# Patient Record
Sex: FEMALE | Race: ASIAN | NOT HISPANIC OR LATINO | Employment: OTHER | URBAN - METROPOLITAN AREA
[De-identification: names, ages, dates, MRNs, and addresses within clinical notes are randomized per-mention and may not be internally consistent; named-entity substitution may affect disease eponyms.]

---

## 2018-11-24 ENCOUNTER — AMBULATORY - HEALTHEAST (OUTPATIENT)
Dept: NURSING | Facility: CLINIC | Age: 78
End: 2018-11-24

## 2018-11-24 DIAGNOSIS — Z23 FLU VACCINE NEED: ICD-10-CM

## 2019-01-02 ENCOUNTER — OFFICE VISIT - HEALTHEAST (OUTPATIENT)
Dept: INTERNAL MEDICINE | Facility: CLINIC | Age: 79
End: 2019-01-02

## 2019-01-02 ENCOUNTER — COMMUNICATION - HEALTHEAST (OUTPATIENT)
Dept: TELEHEALTH | Facility: CLINIC | Age: 79
End: 2019-01-02

## 2019-01-02 DIAGNOSIS — Z12.31 ENCOUNTER FOR SCREENING MAMMOGRAM FOR MALIGNANT NEOPLASM OF BREAST: ICD-10-CM

## 2019-01-02 DIAGNOSIS — R03.0 ELEVATED BLOOD PRESSURE READING WITHOUT DIAGNOSIS OF HYPERTENSION: ICD-10-CM

## 2019-01-02 DIAGNOSIS — K90.9 INTESTINAL MALABSORPTION: ICD-10-CM

## 2019-01-02 DIAGNOSIS — Z00.00 ROUTINE GENERAL MEDICAL EXAMINATION AT A HEALTH CARE FACILITY: ICD-10-CM

## 2019-01-02 DIAGNOSIS — R19.8 OTHER SPECIFIED SYMPTOMS AND SIGNS INVOLVING THE DIGESTIVE SYSTEM AND ABDOMEN: ICD-10-CM

## 2019-01-02 DIAGNOSIS — Z00.00 ROUTINE HEALTH MAINTENANCE: ICD-10-CM

## 2019-01-02 DIAGNOSIS — M81.0 OSTEOPOROSIS: ICD-10-CM

## 2019-01-02 DIAGNOSIS — R31.9 HEMATURIA, UNSPECIFIED: ICD-10-CM

## 2019-01-02 DIAGNOSIS — K21.00 REFLUX ESOPHAGITIS: ICD-10-CM

## 2019-01-02 ASSESSMENT — MIFFLIN-ST. JEOR: SCORE: 853.61

## 2019-01-11 ENCOUNTER — AMBULATORY - HEALTHEAST (OUTPATIENT)
Dept: LAB | Facility: CLINIC | Age: 79
End: 2019-01-11

## 2019-01-11 ENCOUNTER — COMMUNICATION - HEALTHEAST (OUTPATIENT)
Dept: INTERNAL MEDICINE | Facility: CLINIC | Age: 79
End: 2019-01-11

## 2019-01-11 DIAGNOSIS — K90.9 INTESTINAL MALABSORPTION: ICD-10-CM

## 2019-01-11 DIAGNOSIS — M81.0 OSTEOPOROSIS: ICD-10-CM

## 2019-01-11 DIAGNOSIS — R03.0 ELEVATED BLOOD PRESSURE READING WITHOUT DIAGNOSIS OF HYPERTENSION: ICD-10-CM

## 2019-01-11 DIAGNOSIS — Z00.00 ROUTINE HEALTH MAINTENANCE: ICD-10-CM

## 2019-01-11 DIAGNOSIS — R31.9 HEMATURIA, UNSPECIFIED: ICD-10-CM

## 2019-01-11 DIAGNOSIS — R19.8 OTHER SPECIFIED SYMPTOMS AND SIGNS INVOLVING THE DIGESTIVE SYSTEM AND ABDOMEN: ICD-10-CM

## 2019-01-11 LAB
ALBUMIN SERPL-MCNC: 3.9 G/DL (ref 3.5–5)
ALBUMIN UR-MCNC: NEGATIVE MG/DL
ALP SERPL-CCNC: 126 U/L (ref 45–120)
ALT SERPL W P-5'-P-CCNC: 14 U/L (ref 0–45)
ANION GAP SERPL CALCULATED.3IONS-SCNC: 12 MMOL/L (ref 5–18)
APPEARANCE UR: CLEAR
AST SERPL W P-5'-P-CCNC: 21 U/L (ref 0–40)
BACTERIA #/AREA URNS HPF: ABNORMAL HPF
BILIRUB SERPL-MCNC: 0.5 MG/DL (ref 0–1)
BILIRUB UR QL STRIP: NEGATIVE
BUN SERPL-MCNC: 15 MG/DL (ref 8–28)
CALCIUM SERPL-MCNC: 9.6 MG/DL (ref 8.5–10.5)
CHLORIDE BLD-SCNC: 104 MMOL/L (ref 98–107)
CHOLEST SERPL-MCNC: 207 MG/DL
CO2 SERPL-SCNC: 24 MMOL/L (ref 22–31)
COLOR UR AUTO: YELLOW
CREAT SERPL-MCNC: 0.82 MG/DL (ref 0.6–1.1)
ERYTHROCYTE [DISTWIDTH] IN BLOOD BY AUTOMATED COUNT: 12.2 % (ref 11–14.5)
FASTING STATUS PATIENT QL REPORTED: YES
GFR SERPL CREATININE-BSD FRML MDRD: >60 ML/MIN/1.73M2
GLUCOSE BLD-MCNC: 84 MG/DL (ref 70–125)
GLUCOSE UR STRIP-MCNC: NEGATIVE MG/DL
HCT VFR BLD AUTO: 44.5 % (ref 35–47)
HDLC SERPL-MCNC: 55 MG/DL
HGB BLD-MCNC: 14.6 G/DL (ref 12–16)
HGB UR QL STRIP: NEGATIVE
KETONES UR STRIP-MCNC: NEGATIVE MG/DL
LDLC SERPL CALC-MCNC: 123 MG/DL
LEUKOCYTE ESTERASE UR QL STRIP: ABNORMAL
MCH RBC QN AUTO: 29.5 PG (ref 27–34)
MCHC RBC AUTO-ENTMCNC: 32.8 G/DL (ref 32–36)
MCV RBC AUTO: 90 FL (ref 80–100)
NITRATE UR QL: NEGATIVE
PH UR STRIP: 5 [PH] (ref 5–8)
PLATELET # BLD AUTO: 367 THOU/UL (ref 140–440)
PMV BLD AUTO: 6.7 FL (ref 7–10)
POTASSIUM BLD-SCNC: 4.5 MMOL/L (ref 3.5–5)
PROT SERPL-MCNC: 8 G/DL (ref 6–8)
RBC # BLD AUTO: 4.95 MILL/UL (ref 3.8–5.4)
RBC #/AREA URNS AUTO: ABNORMAL HPF
SODIUM SERPL-SCNC: 140 MMOL/L (ref 136–145)
SP GR UR STRIP: 1.02 (ref 1–1.03)
SQUAMOUS #/AREA URNS AUTO: ABNORMAL LPF
TRIGL SERPL-MCNC: 147 MG/DL
TSH SERPL DL<=0.005 MIU/L-ACNC: 2.4 UIU/ML (ref 0.3–5)
UROBILINOGEN UR STRIP-ACNC: ABNORMAL
WBC #/AREA URNS AUTO: ABNORMAL HPF
WBC: 7.5 THOU/UL (ref 4–11)

## 2019-01-14 LAB — 25(OH)D3 SERPL-MCNC: 22 NG/ML (ref 30–80)

## 2019-01-15 ENCOUNTER — COMMUNICATION - HEALTHEAST (OUTPATIENT)
Dept: INTERNAL MEDICINE | Facility: CLINIC | Age: 79
End: 2019-01-15

## 2019-01-16 ENCOUNTER — COMMUNICATION - HEALTHEAST (OUTPATIENT)
Dept: INTERNAL MEDICINE | Facility: CLINIC | Age: 79
End: 2019-01-16

## 2019-01-16 ENCOUNTER — OFFICE VISIT - HEALTHEAST (OUTPATIENT)
Dept: INTERNAL MEDICINE | Facility: CLINIC | Age: 79
End: 2019-01-16

## 2019-01-16 DIAGNOSIS — R03.0 ELEVATED BP WITHOUT DIAGNOSIS OF HYPERTENSION: ICD-10-CM

## 2019-01-16 DIAGNOSIS — Z00.00 ROUTINE HEALTH MAINTENANCE: ICD-10-CM

## 2019-01-16 LAB
HEMOCCULT SP1 STL QL: NEGATIVE
HEMOCCULT SP2 STL QL: NEGATIVE
HEMOCCULT SP3 STL QL: NEGATIVE

## 2019-01-21 ENCOUNTER — RECORDS - HEALTHEAST (OUTPATIENT)
Dept: BONE DENSITY | Facility: CLINIC | Age: 79
End: 2019-01-21

## 2019-01-21 ENCOUNTER — RECORDS - HEALTHEAST (OUTPATIENT)
Dept: ADMINISTRATIVE | Facility: OTHER | Age: 79
End: 2019-01-21

## 2019-01-21 DIAGNOSIS — M81.0 AGE-RELATED OSTEOPOROSIS WITHOUT CURRENT PATHOLOGICAL FRACTURE: ICD-10-CM

## 2019-01-31 ENCOUNTER — COMMUNICATION - HEALTHEAST (OUTPATIENT)
Dept: TELEHEALTH | Facility: CLINIC | Age: 79
End: 2019-01-31

## 2019-01-31 ENCOUNTER — COMMUNICATION - HEALTHEAST (OUTPATIENT)
Dept: INTERNAL MEDICINE | Facility: CLINIC | Age: 79
End: 2019-01-31

## 2019-01-31 ENCOUNTER — OFFICE VISIT - HEALTHEAST (OUTPATIENT)
Dept: INTERNAL MEDICINE | Facility: CLINIC | Age: 79
End: 2019-01-31

## 2019-01-31 DIAGNOSIS — M81.0 AGE-RELATED OSTEOPOROSIS WITHOUT CURRENT PATHOLOGICAL FRACTURE: ICD-10-CM

## 2019-01-31 DIAGNOSIS — R74.8 ELEVATED ALKALINE PHOSPHATASE LEVEL: ICD-10-CM

## 2019-02-08 ENCOUNTER — COMMUNICATION - HEALTHEAST (OUTPATIENT)
Dept: INTERNAL MEDICINE | Facility: CLINIC | Age: 79
End: 2019-02-08

## 2019-02-15 ENCOUNTER — OFFICE VISIT - HEALTHEAST (OUTPATIENT)
Dept: PHARMACY | Facility: CLINIC | Age: 79
End: 2019-02-15

## 2019-02-15 ENCOUNTER — COMMUNICATION - HEALTHEAST (OUTPATIENT)
Dept: TELEHEALTH | Facility: CLINIC | Age: 79
End: 2019-02-15

## 2019-02-15 DIAGNOSIS — M81.0 AGE-RELATED OSTEOPOROSIS WITHOUT CURRENT PATHOLOGICAL FRACTURE: ICD-10-CM

## 2019-02-21 ENCOUNTER — HOSPITAL ENCOUNTER (OUTPATIENT)
Dept: NUCLEAR MEDICINE | Facility: CLINIC | Age: 79
Discharge: HOME | End: 2019-02-21
Attending: INTERNAL MEDICINE

## 2019-02-21 DIAGNOSIS — M81.0 AGE-RELATED OSTEOPOROSIS WITHOUT CURRENT PATHOLOGICAL FRACTURE: ICD-10-CM

## 2019-02-21 DIAGNOSIS — R74.8 ELEVATED ALKALINE PHOSPHATASE LEVEL: ICD-10-CM

## 2019-02-22 ENCOUNTER — COMMUNICATION - HEALTHEAST (OUTPATIENT)
Dept: INTERNAL MEDICINE | Facility: CLINIC | Age: 79
End: 2019-02-22

## 2019-03-20 ENCOUNTER — OFFICE VISIT - HEALTHEAST (OUTPATIENT)
Dept: INTERNAL MEDICINE | Facility: CLINIC | Age: 79
End: 2019-03-20

## 2019-03-20 DIAGNOSIS — M81.0 AGE-RELATED OSTEOPOROSIS WITHOUT CURRENT PATHOLOGICAL FRACTURE: ICD-10-CM

## 2019-03-20 LAB
CALCIUM, IONIZED MEASURED: 1.17 MMOL/L (ref 1.11–1.3)
ION CA PH 7.4: 1.14 MMOL/L (ref 1.11–1.3)
PH: 7.33 (ref 7.35–7.45)

## 2019-03-21 ENCOUNTER — COMMUNICATION - HEALTHEAST (OUTPATIENT)
Dept: INTERNAL MEDICINE | Facility: CLINIC | Age: 79
End: 2019-03-21

## 2019-06-28 ENCOUNTER — COMMUNICATION - HEALTHEAST (OUTPATIENT)
Dept: GERIATRIC MEDICINE | Facility: CLINIC | Age: 79
End: 2019-06-28

## 2019-11-07 ENCOUNTER — COMMUNICATION - HEALTHEAST (OUTPATIENT)
Dept: GERIATRIC MEDICINE | Facility: CLINIC | Age: 79
End: 2019-11-07

## 2019-11-15 ENCOUNTER — COMMUNICATION - HEALTHEAST (OUTPATIENT)
Dept: GERIATRIC MEDICINE | Facility: CLINIC | Age: 79
End: 2019-11-15

## 2019-11-21 ASSESSMENT — ACTIVITIES OF DAILY LIVING (ADL): DEPENDENT_IADLS:: INDEPENDENT

## 2019-12-02 ENCOUNTER — COMMUNICATION - HEALTHEAST (OUTPATIENT)
Dept: GERIATRIC MEDICINE | Facility: CLINIC | Age: 79
End: 2019-12-02

## 2019-12-06 ENCOUNTER — AMBULATORY - HEALTHEAST (OUTPATIENT)
Dept: NURSING | Facility: CLINIC | Age: 79
End: 2019-12-06

## 2019-12-06 DIAGNOSIS — Z23 NEED FOR IMMUNIZATION AGAINST INFLUENZA: ICD-10-CM

## 2019-12-31 ENCOUNTER — COMMUNICATION - HEALTHEAST (OUTPATIENT)
Dept: TELEHEALTH | Facility: CLINIC | Age: 79
End: 2019-12-31

## 2020-01-03 ENCOUNTER — OFFICE VISIT - HEALTHEAST (OUTPATIENT)
Dept: INTERNAL MEDICINE | Facility: CLINIC | Age: 80
End: 2020-01-03

## 2020-01-03 DIAGNOSIS — M81.0 AGE-RELATED OSTEOPOROSIS WITHOUT CURRENT PATHOLOGICAL FRACTURE: ICD-10-CM

## 2020-01-03 DIAGNOSIS — R20.9 ALTERED SENSATION OF FOOT: ICD-10-CM

## 2020-01-03 DIAGNOSIS — K21.00 REFLUX ESOPHAGITIS: ICD-10-CM

## 2020-01-03 DIAGNOSIS — R26.89 BALANCE PROBLEMS: ICD-10-CM

## 2020-01-03 DIAGNOSIS — R74.8 ELEVATED ALKALINE PHOSPHATASE LEVEL: ICD-10-CM

## 2020-01-03 DIAGNOSIS — Z00.00 ROUTINE GENERAL MEDICAL EXAMINATION AT A HEALTH CARE FACILITY: ICD-10-CM

## 2020-01-03 LAB
ALBUMIN SERPL-MCNC: 4.1 G/DL (ref 3.5–5)
ALBUMIN UR-MCNC: NEGATIVE MG/DL
ALP SERPL-CCNC: 198 U/L (ref 45–120)
ALT SERPL W P-5'-P-CCNC: 14 U/L (ref 0–45)
ANION GAP SERPL CALCULATED.3IONS-SCNC: 12 MMOL/L (ref 5–18)
APPEARANCE UR: CLEAR
AST SERPL W P-5'-P-CCNC: 20 U/L (ref 0–40)
BACTERIA #/AREA URNS HPF: ABNORMAL HPF
BILIRUB SERPL-MCNC: 0.3 MG/DL (ref 0–1)
BILIRUB UR QL STRIP: NEGATIVE
BUN SERPL-MCNC: 25 MG/DL (ref 8–28)
CALCIUM SERPL-MCNC: 9.8 MG/DL (ref 8.5–10.5)
CHLORIDE BLD-SCNC: 103 MMOL/L (ref 98–107)
CO2 SERPL-SCNC: 25 MMOL/L (ref 22–31)
COLOR UR AUTO: YELLOW
CREAT SERPL-MCNC: 0.77 MG/DL (ref 0.6–1.1)
ERYTHROCYTE [DISTWIDTH] IN BLOOD BY AUTOMATED COUNT: 11.8 % (ref 11–14.5)
GFR SERPL CREATININE-BSD FRML MDRD: >60 ML/MIN/1.73M2
GLUCOSE BLD-MCNC: 93 MG/DL (ref 70–125)
GLUCOSE UR STRIP-MCNC: NEGATIVE MG/DL
HCT VFR BLD AUTO: 42.3 % (ref 35–47)
HGB BLD-MCNC: 13.8 G/DL (ref 12–16)
HGB UR QL STRIP: ABNORMAL
KETONES UR STRIP-MCNC: NEGATIVE MG/DL
LDLC SERPL CALC-MCNC: 165 MG/DL
LEUKOCYTE ESTERASE UR QL STRIP: ABNORMAL
MAGNESIUM SERPL-MCNC: 2 MG/DL (ref 1.8–2.6)
MCH RBC QN AUTO: 30 PG (ref 27–34)
MCHC RBC AUTO-ENTMCNC: 32.6 G/DL (ref 32–36)
MCV RBC AUTO: 92 FL (ref 80–100)
MUCOUS THREADS #/AREA URNS LPF: ABNORMAL LPF
NITRATE UR QL: NEGATIVE
PH UR STRIP: 5.5 [PH] (ref 5–8)
PLATELET # BLD AUTO: 362 THOU/UL (ref 140–440)
PMV BLD AUTO: 6.3 FL (ref 7–10)
POTASSIUM BLD-SCNC: 4.3 MMOL/L (ref 3.5–5)
PROT SERPL-MCNC: 7.9 G/DL (ref 6–8)
RBC # BLD AUTO: 4.61 MILL/UL (ref 3.8–5.4)
RBC #/AREA URNS AUTO: ABNORMAL HPF
SODIUM SERPL-SCNC: 140 MMOL/L (ref 136–145)
SP GR UR STRIP: 1.02 (ref 1–1.03)
SQUAMOUS #/AREA URNS AUTO: ABNORMAL LPF
TSH SERPL DL<=0.005 MIU/L-ACNC: 1.39 UIU/ML (ref 0.3–5)
UROBILINOGEN UR STRIP-ACNC: ABNORMAL
VIT B12 SERPL-MCNC: 242 PG/ML (ref 213–816)
WBC #/AREA URNS AUTO: ABNORMAL HPF
WBC: 8.5 THOU/UL (ref 4–11)

## 2020-01-03 ASSESSMENT — MIFFLIN-ST. JEOR: SCORE: 868.58

## 2020-01-04 LAB — BACTERIA SPEC CULT: NO GROWTH

## 2020-01-05 ENCOUNTER — COMMUNICATION - HEALTHEAST (OUTPATIENT)
Dept: INTERNAL MEDICINE | Facility: CLINIC | Age: 80
End: 2020-01-05

## 2020-01-05 DIAGNOSIS — Z00.00 ROUTINE GENERAL MEDICAL EXAMINATION AT A HEALTH CARE FACILITY: ICD-10-CM

## 2020-01-06 LAB
25(OH)D3 SERPL-MCNC: 32.4 NG/ML (ref 30–80)
25(OH)D3 SERPL-MCNC: 32.4 NG/ML (ref 30–80)

## 2020-01-08 LAB
ALP BONE SERPL-CCNC: 107 U/L (ref 0–55)
ALP LIVER SERPL-CCNC: 88 U/L (ref 0–94)
ALP OTHER SERPL-CCNC: 0 U/L
ALP SERPL-CCNC: 195 U/L (ref 40–120)

## 2020-01-13 ENCOUNTER — HOSPITAL ENCOUNTER (OUTPATIENT)
Dept: MAMMOGRAPHY | Facility: CLINIC | Age: 80
Discharge: HOME | End: 2020-01-13
Attending: INTERNAL MEDICINE

## 2020-01-13 DIAGNOSIS — Z00.00 ROUTINE HEALTH MAINTENANCE: ICD-10-CM

## 2020-01-13 DIAGNOSIS — Z12.31 ENCOUNTER FOR SCREENING MAMMOGRAM FOR MALIGNANT NEOPLASM OF BREAST: ICD-10-CM

## 2020-01-31 ENCOUNTER — RECORDS - HEALTHEAST (OUTPATIENT)
Dept: BONE DENSITY | Facility: CLINIC | Age: 80
End: 2020-01-31

## 2020-01-31 ENCOUNTER — RECORDS - HEALTHEAST (OUTPATIENT)
Dept: ADMINISTRATIVE | Facility: OTHER | Age: 80
End: 2020-01-31

## 2020-01-31 DIAGNOSIS — Z00.00 ENCOUNTER FOR GENERAL ADULT MEDICAL EXAMINATION WITHOUT ABNORMAL FINDINGS: ICD-10-CM

## 2020-02-07 ENCOUNTER — OFFICE VISIT - HEALTHEAST (OUTPATIENT)
Dept: PHYSICAL THERAPY | Facility: REHABILITATION | Age: 80
End: 2020-02-07

## 2020-02-07 DIAGNOSIS — R29.898 WEAKNESS OF BOTH LOWER EXTREMITIES: ICD-10-CM

## 2020-02-07 DIAGNOSIS — R26.89 BALANCE PROBLEMS: ICD-10-CM

## 2020-02-07 DIAGNOSIS — R26.81 UNSTEADINESS ON FEET: ICD-10-CM

## 2020-03-04 ENCOUNTER — COMMUNICATION - HEALTHEAST (OUTPATIENT)
Dept: INTERNAL MEDICINE | Facility: CLINIC | Age: 80
End: 2020-03-04

## 2020-03-04 DIAGNOSIS — K21.00 REFLUX ESOPHAGITIS: ICD-10-CM

## 2020-03-10 ENCOUNTER — AMBULATORY - HEALTHEAST (OUTPATIENT)
Dept: OTHER | Facility: CLINIC | Age: 80
End: 2020-03-10

## 2020-03-31 ENCOUNTER — COMMUNICATION - HEALTHEAST (OUTPATIENT)
Dept: GERIATRIC MEDICINE | Facility: CLINIC | Age: 80
End: 2020-03-31

## 2020-05-19 ENCOUNTER — COMMUNICATION - HEALTHEAST (OUTPATIENT)
Dept: GERIATRIC MEDICINE | Facility: CLINIC | Age: 80
End: 2020-05-19

## 2020-09-03 ENCOUNTER — COMMUNICATION - HEALTHEAST (OUTPATIENT)
Dept: GERIATRIC MEDICINE | Facility: CLINIC | Age: 80
End: 2020-09-03

## 2020-09-08 ENCOUNTER — COMMUNICATION - HEALTHEAST (OUTPATIENT)
Dept: GERIATRIC MEDICINE | Facility: CLINIC | Age: 80
End: 2020-09-08

## 2020-09-25 ENCOUNTER — COMMUNICATION - HEALTHEAST (OUTPATIENT)
Dept: GERIATRIC MEDICINE | Facility: CLINIC | Age: 80
End: 2020-09-25

## 2020-09-29 ASSESSMENT — ACTIVITIES OF DAILY LIVING (ADL): DEPENDENT_IADLS:: INDEPENDENT

## 2020-10-01 ENCOUNTER — COMMUNICATION - HEALTHEAST (OUTPATIENT)
Dept: GERIATRIC MEDICINE | Facility: CLINIC | Age: 80
End: 2020-10-01

## 2020-10-23 ENCOUNTER — AMBULATORY - HEALTHEAST (OUTPATIENT)
Dept: NURSING | Facility: CLINIC | Age: 80
End: 2020-10-23

## 2020-12-08 ENCOUNTER — COMMUNICATION - HEALTHEAST (OUTPATIENT)
Dept: INTERNAL MEDICINE | Facility: CLINIC | Age: 80
End: 2020-12-08

## 2020-12-08 DIAGNOSIS — Z00.00 ROUTINE GENERAL MEDICAL EXAMINATION AT A HEALTH CARE FACILITY: ICD-10-CM

## 2021-01-06 ENCOUNTER — OFFICE VISIT - HEALTHEAST (OUTPATIENT)
Dept: INTERNAL MEDICINE | Facility: CLINIC | Age: 81
End: 2021-01-06

## 2021-01-06 DIAGNOSIS — E78.5 DYSLIPIDEMIA: ICD-10-CM

## 2021-01-06 DIAGNOSIS — K21.00 GASTROESOPHAGEAL REFLUX DISEASE WITH ESOPHAGITIS WITHOUT HEMORRHAGE: ICD-10-CM

## 2021-01-06 DIAGNOSIS — M81.0 AGE-RELATED OSTEOPOROSIS WITHOUT CURRENT PATHOLOGICAL FRACTURE: ICD-10-CM

## 2021-01-06 DIAGNOSIS — Z00.00 ROUTINE GENERAL MEDICAL EXAMINATION AT A HEALTH CARE FACILITY: ICD-10-CM

## 2021-01-06 DIAGNOSIS — Z92.29 PERSONAL HISTORY OF OTHER DRUG THERAPY: ICD-10-CM

## 2021-01-06 DIAGNOSIS — K21.00 REFLUX ESOPHAGITIS: ICD-10-CM

## 2021-01-06 LAB
ALBUMIN SERPL-MCNC: 4 G/DL (ref 3.5–5)
ALBUMIN UR-MCNC: NEGATIVE MG/DL
ALP SERPL-CCNC: 179 U/L (ref 45–120)
ALT SERPL W P-5'-P-CCNC: 13 U/L (ref 0–45)
ANION GAP SERPL CALCULATED.3IONS-SCNC: 12 MMOL/L (ref 5–18)
APPEARANCE UR: CLEAR
AST SERPL W P-5'-P-CCNC: 20 U/L (ref 0–40)
BACTERIA #/AREA URNS HPF: ABNORMAL HPF
BILIRUB SERPL-MCNC: 0.6 MG/DL (ref 0–1)
BILIRUB UR QL STRIP: NEGATIVE
BUN SERPL-MCNC: 20 MG/DL (ref 8–28)
CALCIUM SERPL-MCNC: 9.4 MG/DL (ref 8.5–10.5)
CHLORIDE BLD-SCNC: 103 MMOL/L (ref 98–107)
CHOLEST SERPL-MCNC: 211 MG/DL
CO2 SERPL-SCNC: 26 MMOL/L (ref 22–31)
COLOR UR AUTO: YELLOW
CREAT SERPL-MCNC: 0.8 MG/DL (ref 0.6–1.1)
ERYTHROCYTE [DISTWIDTH] IN BLOOD BY AUTOMATED COUNT: 11.9 % (ref 11–14.5)
FASTING STATUS PATIENT QL REPORTED: YES
GFR SERPL CREATININE-BSD FRML MDRD: >60 ML/MIN/1.73M2
GLUCOSE BLD-MCNC: 87 MG/DL (ref 70–125)
GLUCOSE UR STRIP-MCNC: NEGATIVE MG/DL
HCT VFR BLD AUTO: 44.3 % (ref 35–47)
HDLC SERPL-MCNC: 57 MG/DL
HGB BLD-MCNC: 14.3 G/DL (ref 12–16)
HGB UR QL STRIP: NEGATIVE
KETONES UR STRIP-MCNC: NEGATIVE MG/DL
LDLC SERPL CALC-MCNC: 115 MG/DL
LEUKOCYTE ESTERASE UR QL STRIP: ABNORMAL
MCH RBC QN AUTO: 29.5 PG (ref 27–34)
MCHC RBC AUTO-ENTMCNC: 32.4 G/DL (ref 32–36)
MCV RBC AUTO: 91 FL (ref 80–100)
NITRATE UR QL: NEGATIVE
PH UR STRIP: 5.5 [PH] (ref 5–8)
PLATELET # BLD AUTO: 348 THOU/UL (ref 140–440)
PMV BLD AUTO: 6.5 FL (ref 7–10)
POTASSIUM BLD-SCNC: 4.7 MMOL/L (ref 3.5–5)
PROT SERPL-MCNC: 7.5 G/DL (ref 6–8)
RBC # BLD AUTO: 4.87 MILL/UL (ref 3.8–5.4)
RBC #/AREA URNS AUTO: ABNORMAL HPF
SODIUM SERPL-SCNC: 141 MMOL/L (ref 136–145)
SP GR UR STRIP: 1.02 (ref 1–1.03)
SQUAMOUS #/AREA URNS AUTO: ABNORMAL LPF
TRANS CELLS #/AREA URNS HPF: ABNORMAL LPF
TRIGL SERPL-MCNC: 193 MG/DL
UROBILINOGEN UR STRIP-ACNC: ABNORMAL
WBC #/AREA URNS AUTO: ABNORMAL HPF
WBC: 8.6 THOU/UL (ref 4–11)

## 2021-01-06 ASSESSMENT — MIFFLIN-ST. JEOR: SCORE: 855.76

## 2021-01-07 LAB
25(OH)D3 SERPL-MCNC: 27.1 NG/ML (ref 30–80)
25(OH)D3 SERPL-MCNC: 27.1 NG/ML (ref 30–80)
ALP BONE SERPL-MCNC: 44.3 UG/L
BACTERIA SPEC CULT: NO GROWTH

## 2021-02-16 ENCOUNTER — AMBULATORY - HEALTHEAST (OUTPATIENT)
Dept: NURSING | Facility: CLINIC | Age: 81
End: 2021-02-16

## 2021-03-09 ENCOUNTER — AMBULATORY - HEALTHEAST (OUTPATIENT)
Dept: NURSING | Facility: CLINIC | Age: 81
End: 2021-03-09

## 2021-03-16 ENCOUNTER — OFFICE VISIT - HEALTHEAST (OUTPATIENT)
Dept: INTERNAL MEDICINE | Facility: CLINIC | Age: 81
End: 2021-03-16

## 2021-03-16 DIAGNOSIS — K21.00 GASTROESOPHAGEAL REFLUX DISEASE WITH ESOPHAGITIS WITHOUT HEMORRHAGE: ICD-10-CM

## 2021-03-16 DIAGNOSIS — L72.9 SUBCUTANEOUS CYST: ICD-10-CM

## 2021-03-16 DIAGNOSIS — M81.0 AGE-RELATED OSTEOPOROSIS WITHOUT CURRENT PATHOLOGICAL FRACTURE: ICD-10-CM

## 2021-03-31 ENCOUNTER — COMMUNICATION - HEALTHEAST (OUTPATIENT)
Dept: GERIATRIC MEDICINE | Facility: CLINIC | Age: 81
End: 2021-03-31

## 2021-04-22 ENCOUNTER — COMMUNICATION - HEALTHEAST (OUTPATIENT)
Dept: INTERNAL MEDICINE | Facility: CLINIC | Age: 81
End: 2021-04-22

## 2021-04-27 ENCOUNTER — COMMUNICATION - HEALTHEAST (OUTPATIENT)
Dept: GERIATRIC MEDICINE | Facility: CLINIC | Age: 81
End: 2021-04-27

## 2021-05-04 ENCOUNTER — OFFICE VISIT - HEALTHEAST (OUTPATIENT)
Dept: INTERNAL MEDICINE | Facility: CLINIC | Age: 81
End: 2021-05-04

## 2021-05-04 DIAGNOSIS — M81.0 AGE-RELATED OSTEOPOROSIS WITHOUT CURRENT PATHOLOGICAL FRACTURE: ICD-10-CM

## 2021-05-27 VITALS
OXYGEN SATURATION: 97 % | DIASTOLIC BLOOD PRESSURE: 76 MMHG | SYSTOLIC BLOOD PRESSURE: 143 MMHG | HEART RATE: 71 BPM | WEIGHT: 108 LBS

## 2021-05-30 NOTE — PROGRESS NOTES
[facility] Care Coordination Contact    Called adult daughter Tanna Graham to complete six month assessment and left a message requesting a return call.    Care coordinator tasked CMS to mail UTR letter.  No voice mail option at home.      ADELAIDE Maguire  [facility]  Phone: [phone]

## 2021-05-30 NOTE — PROGRESS NOTES
"Per CC, mailed client an \"Unable to Contact\" letter.    Per CC, continues to be unable to contact member for assessment.    Kim Julio  Care Management Specialist  [facility]  [phone]    "

## 2021-06-02 VITALS — BODY MASS INDEX: 20.89 KG/M2 | WEIGHT: 104.3 LBS

## 2021-06-02 VITALS — BODY MASS INDEX: 20.84 KG/M2 | WEIGHT: 103.4 LBS | HEIGHT: 59 IN

## 2021-06-02 VITALS — BODY MASS INDEX: 20.81 KG/M2 | WEIGHT: 103.9 LBS

## 2021-06-03 NOTE — PROGRESS NOTES
[facility] Care Coordination Contact    Called adult daughter Tanna to schedule annual HRA home visit. Left a message requesting a return call to schedule HRA.     ADELAIDE Maugire  [facility]  Phone: [phone]

## 2021-06-03 NOTE — PROGRESS NOTES
[facility] Care Coordination Contact  [facility] Home Visit Assessment     Home visit for Health Risk Assessment with Helen Graham completed on 11/152019    Type of residence:: Other  Current living arrangement:: I live in a private home with family     Assessment completed with:: Patient, Family    Current Care Plan  Member currently receiving the following home care services:     Member currently receiving the following community resources: None      Medication Review  Medication reconciliation completed in Epic: YES  Medication set-up & administration: Independent and sets up on own daily  Self-administers medications  Medication Risk Assessment Medication (1 or more, place referral to MTM):  N/A: No risk factors identified  MTM Referral Placed: No: No risk factors idenified    Mental/Behavioral Health   Depression Screening: See PHQ assessment flowsheet.   Mental health DX:: No      Mental Health Diagnosis: No  Mental Health Services: None: No further intervention needed at this time.    Falls Assessment:   Fallen 2 or more times in the past year?: No   Any fall with injury in the past year?: No    ADL/IADL Dependencies:   Dependent ADLs:: Independent  Dependent IADLs:: Independent    [facility] Health Plan sponsored benefits: Shared information re: Silver Sneakers/gym memberships, ASA, Calcium +D.    PCA Assessment completed at visit: N/A    Elderly Waiver Eligibility: No - does not meet criteria    Care Plan & Recommendations: Helen lives with her daughter's family and her spouse Nikki.  She is the main caregiver for her spouse.  Helen is dependent in all IADL and ADL needs.  Helen and her family will contact Care Coordinator if anything is needed.      See [facility] for detailed assessment information.    Follow-Up Plan: Member informed of future contact, plan to f/u with member with a 6 month telephone assessment.  Contact information shared with member and family, encouraged member to call with  any questions or concerns at any time.    [place] care continuum providers: Please refer to Health Care Home on the Epic Problem List to view this patient's [facility] Care Plan Summary.    ADELAIDE Maguire  [facility]  Phone: [phone]

## 2021-06-03 NOTE — PROGRESS NOTES
[facility] Care Coordination Contact    Received after visit chart from care coordinator.  Completed following tasks: Mailed copy of care plan to client. Mailed HCD and Dental Info to member.    Kim Julio  Care Management Specialist  [facility]  [phone]

## 2021-06-04 VITALS
DIASTOLIC BLOOD PRESSURE: 70 MMHG | HEART RATE: 72 BPM | WEIGHT: 106.7 LBS | BODY MASS INDEX: 21.51 KG/M2 | HEIGHT: 59 IN | SYSTOLIC BLOOD PRESSURE: 132 MMHG

## 2021-06-04 NOTE — PROGRESS NOTES
"  Assessment and Plan:       1. Routine general medical examination at a health care facility    - Urinalysis-UC if Indicated  - Vitamin D, Total (25-Hydroxy)  - HM2(CBC w/o Differential)  - Comprehensive Metabolic Panel  - Ambulatory referral to Medication Management  - DXA Bone Density Scan; Future  - teriparatide (FORTEO) 20 mcg/dose - 600 mcg/2.4 mL injection; Inject 0.08 mL (20 mcg total) under the skin daily.  Dispense: 2.4 mL; Refill: 11  - pen needle, diabetic (SURE-FINE PEN NEEDLES) 31 gauge x 3/16\" Ndle; Use daily with Forteo  Dispense: 100 each; Refill: 3  - Vitamin B12  - Magnesium  - Mammo Screening Bilateral; Future  - LDL Cholesterol, Direct    2. Age-related osteoporosis without current pathological fracture  On Forteo for 1 year now. Will continue for one more year. She would like to meet with MTM to discuss injection technique.   - Vitamin D, Total (25-Hydroxy)    3. Chronic Reflux Esophagitis      4. Altered sensation of foot  Noticed over the last 6 month, like the bottom of the foot is soft and slippery. She wears now shoes inside the house too. No falls, but balance is sometimes poor. She is vegetarian.  - Comprehensive Metabolic Panel  - Vitamin B12  - Magnesium  - Thyroid Stimulating Hormone (TSH)  - LDL Cholesterol, Direct    5. Balance problems    - Ambulatory referral to PT/OT     The patient's current medical problems were reviewed.    I have had an Advance Directives discussion with the patient.  The following health maintenance schedule was reviewed with the patient and provided in printed form in the after visit summary:   Health Maintenance   Topic Date Due     ZOSTER VACCINES (1 of 2) 02/15/1992     MEDICARE ANNUAL WELLNESS VISIT  01/02/2020     FALL RISK ASSESSMENT  01/31/2020     DXA SCAN  01/21/2021     TD 18+ HE  08/09/2022     ADVANCE CARE PLANNING  01/02/2024     LIPID  01/11/2024     PNEUMOCOCCAL IMMUNIZATION 65+ LOW/MEDIUM RISK  Completed     INFLUENZA VACCINE RULE BASED  " Completed        Subjective:   Chief Complaint: Helen Graham is an 79 y.o. female here for an Annual Wellness visit.   HPI:  Acute and chronic medical problems discussed as above.    Review of Systems:    Please see above.  The rest of the review of systems are negative for all systems.    Patient Care Team:  Joellen Christensen MD as PCP - General (Internal Medicine)  Hortencia Davis SW as Lead Care Coordinator (Primary Care - CC)  Joellen Christensen MD as Assigned PCP     Patient Active Problem List   Diagnosis     Chronic Reflux Esophagitis     Malabsorption Syndrome     Osteoporosis     Adnexal mass     No past medical history on file.   Past Surgical History:   Procedure Laterality Date     [place] APPENDECTOMY      Description: Appendectomy;  Recorded: 08/09/2012;     [place] TOTAL ABDOM HYSTERECTOMY      Description: Hysterectomy;  Recorded: 08/09/2012;      No family history on file.   Social History     Socioeconomic History     Marital status:      Spouse name: Not on file     Number of children: Not on file     Years of education: Not on file     Highest education level: Not on file   Occupational History     Not on file   Social Needs     Financial resource strain: Not on file     Food insecurity:     Worry: Not on file     Inability: Not on file     Transportation needs:     Medical: Not on file     Non-medical: Not on file   Tobacco Use     Smoking status: Never Smoker     Smokeless tobacco: Never Used   Substance and Sexual Activity     Alcohol use: No     Frequency: Never     Drug use: No     Sexual activity: Not on file   Lifestyle     Physical activity:     Days per week: Not on file     Minutes per session: Not on file     Stress: Not on file   Relationships     Social connections:     Talks on phone: Not on file     Gets together: Not on file     Attends [employer] service: Not on file     Active member of club or organization: Not on file     Attends meetings of clubs or organizations: Not on file     " Relationship status: Not on file     Intimate partner violence:     Fear of current or ex partner: Not on file     Emotionally abused: Not on file     Physically abused: Not on file     Forced sexual activity: Not on file   Other Topics Concern     Not on file   Social History Narrative     Not on file      Current Outpatient Medications   Medication Sig Dispense Refill     cholecalciferol, vitamin D3, (VITAMIN D3) 2,000 unit Tab Take by mouth.       omeprazole (PRILOSEC) 20 MG capsule Take 1 capsule (20 mg total) by mouth daily. 30 capsule 1     pen needle, diabetic (SURE-FINE PEN NEEDLES) 31 gauge x 3/16\" Ndle Use daily with Forteo 100 each 3     teriparatide (FORTEO) 20 mcg/dose - 600 mcg/2.4 mL injection Inject 0.08 mL (20 mcg total) under the skin daily. 2.4 mL 11     No current facility-administered medications for this visit.       Objective:   Vital Signs:   Visit Vitals  /70 (Patient Site: Right Arm, Patient Position: Sitting, Cuff Size: Adult Regular)   Pulse 72   Ht 4' 11.25\" (1.505 m)   Wt 106 lb 11.2 oz (48.4 kg)   BMI 21.37 kg/m         VisionScreening:   Visual Acuity Screening    Right eye Left eye Both eyes   Without correction:      With correction: 20/40 20/100 20/40        PHYSICAL EXAM  General Appearance: Alert, cooperative, no distress, appears stated age.  Head: Normocephalic, without obvious abnormality, atraumatic  Eyes: PERRL, conjunctiva/corneas clear, EOM's intact  Ears: Normal TM's and external ear canals, both ears  Nose: Nares normal, septum midline,mucosa normal, no drainage  Throat: Lips, mucosa, and tongue normal; teeth and gums normal  Neck: Supple, symmetrical, trachea midline, no adenopathy;  thyroid: not enlarged, symmetric, no tenderness/mass/nodules; no carotid bruit or JVD  Back: Symmetric, no curvature, ROM normal, no CVA tenderness.  Lungs: Clear to auscultation bilaterally, respirations unlabored.  Breasts: No breast masses, tenderness, asymmetry, or nipple " discharge.  Heart: Regular rate and rhythm, S1 and S2 normal, no murmur, rub, or gallop.  Abdomen: Soft, non-tender, bowel sounds active all four quadrants,  no masses, no organomegaly.  Pelvic:declined  Extremities: Extremities normal, atraumatic, no cyanosis or edema.  Skin: Skin color, texture, turgor normal, no rashes or lesions.  Lymph nodes: Cervical, supraclavicular, and axillary nodes normal.  Neurologic: No focal neurological findings.Normal sensation in both feet.      Assessment Results 1/3/2020   Activities of Daily Living No help needed   Instrumental Activities of Daily Living 2-4 - Moderate impairment   Get Up and Go Score -   Mini Cog Total Score 3   Some recent data might be hidden     A Mini-Cog score of 0-2 suggests the possibility of dementia, score of 3-5 suggests no dementia. Results equivocal -  speaking patient.    Identified Health Risks:     The patient reports that she has difficulty with instrumental activities of daily living.  She was provided with a list of local organizations that provide support services and advised to make a follow up appointment in 12 weeks to address this further.   Information regarding advance directives (living [place]), including where she can download the appropriate form, was provided to the patient via the AVS.

## 2021-06-04 NOTE — TELEPHONE ENCOUNTER
RN cannot approve Refill Request    RN can NOT refill this medication Protocol failed and NO refill given. Last office visit: 3/20/2019 Joellen Christensen MD Last Physical: 1/3/2020 Last MTM visit: Visit date not found Last visit same specialty: 3/20/2019 Joellen Christensen MD.  Next visit within 3 mo: Visit date not found  Next physical within 3 mo: Visit date not found      Divya Childers, [facility] Triage/Med Refill 1/5/2020    Requested Prescriptions   Pending Prescriptions Disp Refills     FORTEO 20 mcg/dose - 600 mcg/2.4 mL injection [Pharmacy Med Name: FORTEO 600MCG PEN(20MCG/DOSE)28DOSE] 2.4 mL 11     Sig: INJECT 0.08ML( 20 MCG)UNDER THE SKIN DAILY       There is no refill protocol information for this order

## 2021-06-05 VITALS
BODY MASS INDEX: 20.22 KG/M2 | WEIGHT: 103 LBS | SYSTOLIC BLOOD PRESSURE: 138 MMHG | HEART RATE: 68 BPM | DIASTOLIC BLOOD PRESSURE: 72 MMHG | HEIGHT: 60 IN | OXYGEN SATURATION: 97 %

## 2021-06-05 VITALS
DIASTOLIC BLOOD PRESSURE: 70 MMHG | OXYGEN SATURATION: 98 % | WEIGHT: 105.3 LBS | SYSTOLIC BLOOD PRESSURE: 156 MMHG | HEART RATE: 69 BPM | BODY MASS INDEX: 20.91 KG/M2

## 2021-06-05 NOTE — PROGRESS NOTES
[facility] Discharge Summary  Patient Name: Helen Graham  Date: 3/9/2020  Referral Diagnosis: Balance  Referring provider: Joellen Christensen MD  Visit Diagnosis:   1. Unsteadiness on feet     2. Balance problems     3. Weakness of both lower extremities         Goals:  Pt. will show improved balance for safer : ambulation;other mobility;for community ambulation;independently    Pt will: Able to ambulate up and down stairs with confidence as strength improves within 8-12 visits  Pt will: Able to walk on even surfaces with more confidence as LE strength improves within 8-12 visits      Patient was seen for 1 visit for evaluation only on 2/7/2020.    Patient attended the evaluation only.     Therapy will be discontinued at this time.  The patient will need a new referral to resume.    Thank you for your referral.  Yaz ROOT En[place], PT  3/9/2020  9:30 AM    [facility]   Initial Evaluation    Patient Name: Helen Graham  Date of evaluation: 2/7/2020  Referral Diagnosis: Balance problems  Referring provider: Joellen Christensen MD  Visit Diagnosis:     ICD-10-CM    1. Unsteadiness on feet R26.81    2. Balance problems R26.89    3. Weakness of both lower extremities R29.898        Assessment:       Patient presents with unsteadiness on her feet which has increased over the past year.  She feels like her feet are slippery and is fearful of falling.  She demonstrates gait deviations, decreased APTA, LE strength, and 1 leg stance.  She feels she can do everything for her  she needs to without limitations.  Her Functional limitations are described as occurring with: walking, stairs.  Pt. is appropriate for skilled PT intervention as outlined in the Plan of Care (POC).   She does not want a SEC so wants to do everything to prevent that from happening.    Goals:  Pt. will show improved balance for safer : ambulation;other mobility;for community ambulation;independently    Pt will: Able to  "ambulate up and down stairs with confidence as strength improves within 8-12 visits  Pt will: Able to walk on even surfaces with more confidence as LE strength improves within 8-12 visits      Patient's expectations/goals are realistic.    Barriers to Learning or Achieving Goals:  Osteoporosis     Plan / Patient Instructions:        Plan of Care:   Authorization / Certification Start Date: 02/07/20  Authorization / Certification Number of Visits: 8-12  Communication with: Referral Source  Patient Related Instruction: Nature of Condition;Treatment plan and rationale;Self Care instruction;Basis of treatment  Times per Week: 1-2  Number of Visits: 8-12  Therapeutic Exercise: ROM;Stretching;Strengthening  Neuromuscular Reeducation: balance/proprioception  Gait Training: as needed      Plan for next visit:   Intro stretches for HEP  Intro strengthening HEP  Sit<>stand for home    Fill out NOEL    DaughterTanna  Subjective:         Social information:   Living Situation:single family home, lives with others  and stairs  with railing   Equipment Available: None    History of Present Illness:    Helen is a 79 y.o. female who presents to therapy today with complaints of balance problems. She is the primary caregiver for her  who has Parkinson's but within the past year has noticed her feet are \"slippery\" and and is having difficulty walking. She has been trying to stretch her calves and feels that has helped a little as well as rolling them on something.  She does not like to walk barefoot in the house any more and has to wear shoes.  She does have osteoporosis so would like to prevent falling.      Pain Rating:NA}      Functional limitations are described as occurring with:   walking, stairs    Patient reports benefit from:  using roller on feet and stretching leg       Objective:      Patient Outcome Measures :    Will fill out next visit    Examination  1. Unsteadiness on feet     2. Balance problems     3. " Weakness of both lower extremities       Involved side: Bilateral  Posture Observation:      General standing posture is thoracic kyphosis.  Gait Observation: short steps-decrease stride, wide base of support, will grab wall occasionally    Flexibility:  LE good  Strength:  4 to 4+/5 throughout LE strength    FGA: 9    1 leg stance:  L=4 seconds, L=1 second    APTA score: 9      Treatment Today   Short appointment secondary to Pt and daughter late  TREATMENT MINUTES COMMENTS   Evaluation 30 balance   Self-care/ Home management 10 Handout os Preventing Falls at Home issued to pt today, edu on potential risks   Manual therapy     Neuromuscular Re-education     Therapeutic Activity     Therapeutic Exercises     Gait training     Modality__________________                Total 40    Blank areas are intentional and mean the treatment did not include these items.         PT Evaluation Code: (Please list factors)  Patient History/Comorbidities: none  Examination: decrease FGA, decrease 1 leg stance, weakness, gait deviations  Clinical Presentation: stable  Clinical Decision Making: low    Patient History/  Comorbidities Examination  (body structures and functions, activity limitations, and/or participation restrictions) Clinical Presentation Clinical Decision Making (Complexity)   No documented Comorbidities or personal factors 1-2 Elements Stable and/or uncomplicated Low   1-2 documented comorbidities or personal factor 3 Elements Evolving clinical presentation with changing characteristics Moderate   3-4 documented comorbidities or personal factors 4 or more Unstable and unpredictable High         Yaz Rossi, PT  2/7/2020  6:38 AM

## 2021-06-06 NOTE — TELEPHONE ENCOUNTER
Refill Approved    Rx renewed per Medication Renewal Policy. Medication was last renewed on 1/2/19.    Shara Wilson, [facility] Triage/Med Refill 3/4/2020     Requested Prescriptions   Pending Prescriptions Disp Refills     omeprazole (PRILOSEC) 20 MG capsule [Pharmacy Med Name: OMEPRAZOLE 20MG CAPSULES] 30 capsule 1     Sig: TAKE 1 CAPSULE(20 MG) BY MOUTH DAILY       GI Medications Refill Protocol Passed - 3/4/2020  7:37 AM        Passed - PCP or prescribing provider visit in last 12 or next 3 months.     Last office visit with prescriber/PCP: 3/20/2019 Joellen Christensen MD OR same dept: 3/20/2019 Joellen Christensen MD OR same specialty: 3/20/2019 Joellen Christensen MD  Last physical: 1/3/2020 Last MTM visit: Visit date not found   Next visit within 3 mo: Visit date not found  Next physical within 3 mo: Visit date not found  Prescriber OR PCP: Joellen Christensen MD  Last diagnosis associated with med order: There are no diagnoses linked to this encounter.  If protocol passes may refill for 12 months if within 3 months of last provider visit (or a total of 15 months).

## 2021-06-07 NOTE — PROGRESS NOTES
[facility] Care Coordination Contact    Care Coordinator emailed Daughter Tanna asking for an update on Deependra with the COVID-19 Virus.      ADELAIDE Maguire  [facility]  Phone: [phone]

## 2021-06-07 NOTE — PROGRESS NOTES
Care Coordinator received message from Daughter Tanna that everything is going good for Helen.  Helen is in confinement to stay healthy.      Hortencia Davis [facility]  Phone: [phone]

## 2021-06-08 NOTE — PROGRESS NOTES
[facility] Care Coordination Contact      [facility] Six-Month Telephone Assessment    6 month telephone assessment completed on 05/19/2020.    ER visits: No  Hospitalizations: No  TCU stays: No  Significant health status changes: None  Falls/Injuries: No  ADL/IADL changes: No  Changes in services: No    Caregiver Assessment follow up:  N/A    Goals: See POC in chart for goal progress documentation.      Care Coordinator received email from Aruna Cisse advising that Helen is doing well.  She has no changes to report and no new needs.      Will see member in 6 months for an annual health risk assessment.   Encouraged member to call CC with any questions or concerns in the meantime.     ADELAIDE Maguire  [facility]  Phone: [phone]

## 2021-06-11 NOTE — PROGRESS NOTES
[facility] Care Coordination Contact    Called adult daughter Tanna to schedule annual HRA home visit. Left a message requesting a return call to schedule HRA.     ADELAIDE Maguire  [facility]  Phone: [phone]

## 2021-06-11 NOTE — PROGRESS NOTES
[facility] Care Coordination Contact  [facility] Home Visit Assessment     Home visit for Health Risk Assessment with Helen Graham completed on 9/25/2020    Type of residence:: Private home - stairs  Current living arrangement:: I live in a private home with spouse, I live in a private home with family     Assessment completed with:: Patient, Children, Family, Spouse or significant other    Current Care Plan  Member currently receiving the following home care services:     Member currently receiving the following community resources: None    Phone assessment due to COVID-19.    Medication Review  Medication reconciliation completed in Epic: IF NO, PLEASE EXPLAIN No face to face reconcile due to COVID-19, phone only  Medication set-up & administration: Independent and sets up on own daily  Self-administers medications  Medication Risk Assessment Medication (1 or more, place referral to MTM):  N/A: No risk factors identified  MTM Referral Placed: No: No risk factors idenified    Mental/Behavioral Health   Depression Screening:    PHQ-2 Total Score: 0       Mental health DX:: No        Falls Assessment:   Fallen 2 or more times in the past year?: No   Any fall with injury in the past year?: No    ADL/IADL Dependencies:   Dependent ADLs:: Independent  Dependent IADLs:: Independent    [facility] Health Plan sponsored benefits: Shared information re: Silver Sneakers/gym memberships, ASA, Calcium +D.    PCA Assessment completed at visit: No and Not Applicable     Elderly Waiver Eligibility: No - does not meet criteria    Care Plan & Recommendations: Helen lives with her spouse and her daughter.  Helen is healthy and stable with no services.  She receives informal support from her daughter as needed.  Helen cares for her spouse who has Parkinson's.  Helen has Ostheoporosis and is on daily shots called Forteo.  She is due for a bone screen in 01/21.  Helen will inform Care Coordinator if anything is needed.         See CC for detailed assessment information.    Follow-Up Plan: Member informed of future contact, plan to f/u with member with a 6 month telephone assessment.  Contact information shared with member and family, encouraged member to call with any questions or concerns at any time.    [facility] care continuum providers: Please refer to Health Care Home [facility] the [facility] Problem List to view this patient's [facility] Care Plan Summary.    ADELAIDE Maguire  [facility]  Phone: [phone]

## 2021-06-11 NOTE — PROGRESS NOTES
[facility] Care Coordination Contact    Called adult daughter Tanna to schedule annual HRA home visit. Left a message requesting a return call to schedule HRA.     ADELAIDE Maguire  [facility]  Phone: [phone]

## 2021-06-11 NOTE — PROGRESS NOTES
[facility] Care Coordination Contact    Received after visit chart from care coordinator.  Completed following tasks: Mailed copy of care plan to client.    Mailed POC Signature page to member w/ self-stamped envelope for return.     Kim Julio  Care Management Specialist  [facility]  [phone]

## 2021-06-13 NOTE — TELEPHONE ENCOUNTER
RN cannot approve Refill Request    RN can NOT refill this medication med is not covered by policy/route to provider. Last office visit: 3/20/2019 Joellen Christensen MD Last Physical: 1/3/2020 Last MTM visit: Visit date not found Last visit same specialty: 3/20/2019 Joellen Christensen MD.  Next visit within 3 mo: Visit date not found  Next physical within 3 mo: Visit date not found      Shara Wilson, [facility] Triage/Med Refill 12/8/2020    Requested Prescriptions   Pending Prescriptions Disp Refills     FORTEO 20 mcg/dose (600mcg/2.4mL) injection [Pharmacy Med Name: FORTEO 600MCG PEN(20MCG/DOSE)28DOSE] 2.4 mL 11     Sig: INJECT 20 MCG UNDER THE SKIN DAILY       There is no refill protocol information for this order

## 2021-06-14 NOTE — PATIENT INSTRUCTIONS - HE
Finish last Forteo pen in February.  Prolia 1st in mid March.  Prolia 2nd in 6 months.    DXA in 1/2022 .   Phone number to schedule [phone].    Check with your insurance if Shingrix is covered.        Advance Directive  Patient s advance directive was discussed and I am comfortable with the patient s wishes.  Patient Education   Personalized Prevention Plan  You are due for the preventive services outlined below.  Your care team is available to assist you in scheduling these services.  If you have already completed any of these items, please share that information with your care team to update in your medical record.  Health Maintenance   Topic Date Due     HEPATITIS C SCREENING  1940     ZOSTER VACCINES (1 of 2) 02/15/1992     MEDICARE ANNUAL WELLNESS VISIT  01/06/2022     FALL RISK ASSESSMENT  01/06/2022     TD 18+ HE  08/09/2022     LIPID  01/11/2024     ADVANCE CARE PLANNING  03/10/2025     DEXA SCAN  01/31/2035     Pneumococcal Vaccine: 65+ Years  Completed     INFLUENZA VACCINE RULE BASED  Completed     Pneumococcal Vaccine: Pediatrics (0 to 5 Years) and At-Risk Patients (6 to 64 Years)  Aged Out     HEPATITIS B VACCINES  Aged Out

## 2021-06-16 NOTE — TELEPHONE ENCOUNTER
Telephone Encounter by Alina Mejia at 2/5/2019  4:25 PM     Author: Alina Mejia Service: -- Author Type: --    Filed: 2/5/2019  4:28 PM Encounter Date: 1/31/2019 Status: Addendum    : Alina Mejia    Related Notes: Original Note by Alina Mejia filed at 2/5/2019  4:26 PM       [place] APPROVED:    Approval start date: 1/1/2019  Approval end date: 2/5/2021    Pharmacy has been notified of approval and will contact patient when medication is ready for pickup.     copay is $8.50

## 2021-06-16 NOTE — TELEPHONE ENCOUNTER
Telephone Encounter by Alina Mejia at 2/4/2019  2:32 PM     Author: Alina Mejia Service: -- Author Type: --    Filed: 2/4/2019  2:33 PM Encounter Date: 1/31/2019 Status: Signed    : Alina Mejia       [place] team  [phone]    PA has been initiated.     [phone] marked urgent. Will await determination

## 2021-06-16 NOTE — PROGRESS NOTES
1. Age-related osteoporosis without current pathological fracture     2. Gastroesophageal reflux disease with esophagitis without hemorrhage     3. Subcutaneous cyst       Patient was educated on safety of Prolia utilizing Patient Counseling Chart for Healthcare Providers, as outlined by the Prolia REMS progam.     Return in about 6 months (around 9/16/2021) for Prolia injection.    Patient Instructions   Prolia 1st - 2 weeks after second COVID vaccine.  Prolia 2nd in 6 months with me.    DXA in 1/2022 .   Phone number to schedule [phone].    Daily calcium need is 6819-8387 mg a day from the diet and supplements.  Calcium citrate is easier to digest.  Vitamin D 4000 IU daily recommended.      Chief Complaint   Patient presents with     Osteoporosis Follow Up     Osteo F/U       /70   Pulse 69   Wt 105 lb 4.8 oz (47.8 kg)   SpO2 98%   BMI 20.91 kg/m        Any medication change in the last 6 months? no  Did you take prednisone or other immunosupressant drugs in the last 6 months   (chemo, transplant, rheum, dermatology conditions)? no  Did you have any serious infection in the last 6 months?no  Any recent hospitalizations?no  Do you plan any dental work in the next 2-3 months?no  How much calcium do you take daily from the diet and supplements?1200 mg  How much vit D do you take daily? 2000 IU  Last DXA?  Reviewed and discussed      Patient is here today for the 1st Prolia injection. She completed Forteo 2 years of treatment in February. We discussed calcium and vit D daily needs today. She will need to double vit D daily dose and to start calcium supplements.  I reviewed the lab results:  Component      Latest Ref Rng & Units 1/6/2021   Sodium      136 - 145 mmol/L 141   Potassium      3.5 - 5.0 mmol/L 4.7   Chloride      98 - 107 mmol/L 103   CO2      22 - 31 mmol/L 26   Anion Gap, Calculation      5 - 18 mmol/L 12   Glucose      70 - 125 mg/dL 87   BUN      8 - 28 mg/dL 20   Creatinine      0.60 -  1.10 mg/dL 0.80   GFR MDRD Af Amer      >60 mL/min/1.73m2 >60   GFR MDRD Non Af Amer      >60 mL/min/1.73m2 >60   Bilirubin, Total      0.0 - 1.0 mg/dL 0.6   Calcium      8.5 - 10.5 mg/dL 9.4   Protein, Total      6.0 - 8.0 g/dL 7.5   ALBUMIN      3.5 - 5.0 g/dL 4.0   Alkaline Phosphatase      45 - 120 U/L 179 (H)   AST      0 - 40 U/L 20   ALT      0 - 45 U/L 13     Component      Latest Ref Rng & Units 1/6/2021   Vitamin D, Total (25-Hydroxy)      30.0 - 80.0 ng/mL 27.1 (L)       ROS: complete ROS negative.  Physical exam:  Constitutional:  oriented to person, place, and time, appears well-nourished. No distress.   HENT:   Head: Normocephalic.   Eyes: Conjunctivae are normal.   Neck: Normal range of motion.   Pulmonary/Chest: Effort normal  Musculoskeletal: Normal range of motion.   Neurological: alert and oriented to person, place, and time.   Psychiatric: normal mood and affect.    Since her second COVID vaccine dose was a week ago, she will come in 7-10 days for first Prolia injection.  GERD symptoms are better since taking omeprazole in the morning.  She has small subcutaneous cyst in the right armpit for 3 weeks, it's getting smaller and drying out.She is using some antiseptic cream from [employer] that has boric acid and zinc oxide and she thinks it's helping. No surrounding redness or drainage. If if start getting red, warm, bigger, drainage or painful, she will call me. On the exam it looks benign and not infected.  Next Prolia injection will be in 6 months.     Patient was educated on safety of Prolia utilizing Patient Counseling Chart for Healthcare Providers, as outlined by the Prolia REMS progam.         This note has been dictated using voice recognition software. Any grammatical or context distortions are unintentional and inherent to the software      Patient Active Problem List   Diagnosis     Chronic Reflux Esophagitis     Malabsorption Syndrome     Osteoporosis       Current Outpatient Medications on  "File Prior to Visit   Medication Sig Dispense Refill     cholecalciferol, vitamin D3, (VITAMIN D3) 2,000 unit Tab Take by mouth.       omeprazole (PRILOSEC) 20 MG capsule TAKE 1 CAPSULE(20 MG) BY MOUTH DAILY 90 capsule 3     pen needle, diabetic (SURE-FINE PEN NEEDLES) 31 gauge x 3/16\" Ndle Use daily with Forteo 100 each 3     [DISCONTINUED] FORTEO 20 mcg/dose (600mcg/2.4mL) injection INJECT 20 MCG UNDER THE SKIN DAILY 2.4 mL 2     Current Facility-Administered Medications on File Prior to Visit   Medication Dose Route Frequency Provider Last Rate Last Admin     denosumab 60 mg (PROLIA 60 mg/ml)  60 mg Subcutaneous Q6 Months Joellen Christensen MD         "

## 2021-06-16 NOTE — PATIENT INSTRUCTIONS - HE
Prolia 1st - 2 weeks after second COVID vaccine.  Prolia 2nd in 6 months with me.    DXA in 1/2022 .   Phone number to schedule [phone].    Daily calcium need is 6190-8520 mg a day from the diet and supplements.  Calcium citrate is easier to digest.  Vitamin D 4000 IU daily recommended.

## 2021-06-16 NOTE — PROGRESS NOTES
[facility] Care Coordination    Care Coordinator left message for Daughter Tanna and also emailed Tanna asking for update.      Care Coordinator tasked CMS to mail 6 month UTR Letter.      ADELAIDE Maguire  [facility]  Phone: [phone]

## 2021-06-16 NOTE — PROGRESS NOTES
"Per CC, mailed client an \"Unable to Contact\" letter.    Brad Soto  Care Management Specialist  [facility]  [phone]      "

## 2021-06-17 NOTE — PATIENT INSTRUCTIONS - HE
Patient Instructions by Joellen Christensen MD at 1/2/2019  1:40 PM     Author: Joellen Christensen MD Service: -- Author Type: Physician    Filed: 1/2/2019  2:21 PM Encounter Date: 1/2/2019 Status: Addendum    : Joellen Christensen MD (Physician)    Related Notes: Original Note by Joellen Christensen MD (Physician) filed at 1/2/2019  2:11 PM       Recheck blood pressure.    Check BP at home daily. Follow up with David in 2 weeks.      Patient Education   Instrumental Activities of Daily Living  Your Health Risk Assessment indicates you have difficulties with instrumental activities of daily living which include laundry, housekeeping, banking, shopping, using the telephone, food preparation, transportation, or taking your own medications. Please make a follow up appointment for us to address this issue in more detail.    [employer] has resources available on the following website: [email or web address]     Also, here is a local agency that provides help with meals and other assistance:   [facility] Line: [phone]     Patient Education   Preventing Falls in the Home  As you get older, falls are more likely. Thats because your reaction time slows. Your muscles and joints may also get stiffer, making them less flexible. Illness, medications, and vision changes can also affect your balance. A fall could leave you unable to live on your own. To make your home safer, follow these tips:    Floors    Put nonskid pads under area rugs.    Remove throw rugs.    Replace worn floor coverings.    Tack carpets firmly to each step on carpeted stairs. Put nonskid strips on the edges of uncarpeted stairs.    Keep floors and stairs free of clutter and cords.    Arrange furniture so there are clear pathways.    Clean up any spills right away.    Bathrooms    Install grab bars in the tub or shower.    Apply nonskid strips or put a nonskid rubber mat in the tub or shower.    Sit on a bath chair to bathe.    Use  bathmats with nonskid backing.    Lighting    Keep a flashlight in each room.    Put a nightlight along the pathway between the bedroom and the bathroom.    4237-6992 [employer]. [address] [place] [address], [place] [address]. All rights reserved. This information is not intended as a substitute for professional medical care. Always follow your healthcare professional's instructions.         Patient Education   Understanding Advance Care Planning  Advance care planning is the process of deciding ones own future medical care. It helps ensure that if you cant speak for yourself, your wishes can still be carried out. The plan is a series of legal documents that note a persons wishes. The documents vary by state. Advance care planning may be done when a person has a serious illness that is expected to get worse. It may be done before major surgery. And it can help you and your family be prepared in case of a major illness or injury. Advance care planning helps with making decisions at these times.       A health care proxy is a person who acts as the voice of a patient when the patient cant speak for himself or herself. The name of this role varies by state. It may be called a Durable Medical Power of  or Durable Power of  for Healthcare. It may be called an agent, surrogate, or advocate. Or it may be called a representative or decision maker. It is an official duty that is identified by a legal document. The document also varies by state.    Why Is Advance Care Planning Important?  If a person communicates their healthcare wishes:    They will be given medical care that matches their values and goals.    Their family members will not be forced to make decisions in a crisis with no guidance.  Creating a Plan  Making an advance care plan is often done in 3 steps:    Thinking about ones wishes. To create an advance care plan, you should think about what kind of medical treatment you would want if  you lose the ability to communicate. Are there any situations in which you would refuse or stop treatment? Are there therapies you would want or not want? And whom do you want to make decisions for you? There are many places to learn more about how to plan for your care. Ask your doctor or  for resources.    Picking a health care proxy. This means choosing a trusted person to speak for you only when you cant speak for yourself. When you cannot make medical decisions, your proxy makes sure the instructions in your advance care plan are followed. A proxy does not make decisions based on his or her own opinions. They must put aside those opinions and values if needed, and carry out your wishes.    Filling out the legal documents. There are several kinds of legal documents for advance care planning. Each one tells health care providers your wishes. The documents may vary by state. They must be signed and may need to be witnessed or notarized. You can cancel or change them whenever you wish. Depending on your state, the documents may include a Healthcare Proxy form, Living Will, Durable Medical Power of , Advance Directive, or others.  The Familys Role  The best help a family can give is to support their loved ones wishes. Open and honest communication is vital. Family should express any concerns they have about the patients choices while the patient can still make decisions.    5504-2215 [employer]. [address] [place] [address]. All rights reserved. This information is not intended as a substitute for professional medical care. Always follow your healthcare professional's instructions.         Also, [employer] [place] offers a free, downloadable health care directive that allows you to share your treatment choices and personal preferences if you cannot communicate your wishes. It also allows you to appoint another person (called a health care agent) to make health  care decisions if you are unable to do so. You can download an advance directive by going here: [email or web address]     Patient Education   Personalized Prevention Plan  You are due for the preventive services outlined below.  Your care team is available to assist you in scheduling these services.  If you have already completed any of these items, please share that information with your care team to update in your medical record.  Health Maintenance   Topic Date Due   ? ZOSTER VACCINES (1 of 2) 02/15/1992   ? DXA SCAN  02/15/2007   ? PNEUMOCOCCAL CONJUGATE VACCINE FOR ADULTS (PCV13 OR PREVNAR)  02/15/2007   ? FALL RISK ASSESSMENT  02/15/2007   ? TD 18+ HE  08/09/2022   ? ADVANCE DIRECTIVES DISCUSSED WITH PATIENT  01/02/2024   ? PNEUMOCOCCAL POLYSACCHARIDE VACCINE AGE 65 AND OVER  Completed   ? INFLUENZA VACCINE RULE BASED  Completed

## 2021-06-17 NOTE — PROGRESS NOTES
[facility] Care Coordination      [facility] Six-Month Telephone Assessment    6 month telephone assessment completed on 04/27/2021.    ER visits: No  Hospitalizations: No  TCU stays: No  Significant health status changes: None  Falls/Injuries: No  ADL/IADL changes: No  Changes in services: No    Caregiver Assessment follow up:  None    Goals: See POC in chart for goal progress documentation.      Care Coordinator received email from Daughter Tanna:my parents are doing fine. They didn t have any side reactions to the COVID-19 vaccine.       Will see member in 6 months for an annual health risk assessment.   Encouraged member to call CC with any questions or concerns in the meantime.     ADELAIDE Maguire  [facility]  Phone: [phone]

## 2021-06-17 NOTE — PATIENT INSTRUCTIONS - HE
If you decide to start Prolia, you can schedule the visit with my nurse.   We should do Prolia every 6 months. I can see you in a 6 months when its time for the second Prolia dose.  Repeat DXA scan in 1 year.    Daily calcium need is 5031-2635 mg a day from the diet and supplements.  Calcium citrate is easier to digest.  Vitamin D 2000 IU daily recommended.

## 2021-06-17 NOTE — PATIENT INSTRUCTIONS - HE
Patient Instructions by Joellen Christensen MD at 1/3/2020  3:00 PM     Author: Joellen Christensen MD Service: -- Author Type: Physician    Filed: 1/3/2020  3:56 PM Encounter Date: 1/3/2020 Status: Addendum    : Joellen Christensen MD (Physician)    Related Notes: Original Note by Joellen Christensen MD (Physician) filed at 1/3/2020  3:39 PM       Continue Forteo for one more year.  Take multivitamin daily.  Schedule DXA scan and mammogram.      Patient Education   Instrumental Activities of Daily Living  Your Health Risk Assessment indicates you have difficulties with instrumental activities of daily living which include laundry, housekeeping, banking, shopping, using the telephone, food preparation, transportation, or taking your own medications. Please make a follow up appointment for us to address this issue in more detail.    [employer] has resources available on the following website: [email or web address]     Also, here is a local agency that provides help with meals and other assistance:   [facility] Line: [phone]     Patient Education   Understanding Advance Care Planning  Advance care planning is the process of deciding ones own future medical care. It helps ensure that if you cant speak for yourself, your wishes can still be carried out. The plan is a series of legal documents that note a persons wishes. The documents vary by state. Advance care planning may be done when a person has a serious illness that is expected to get worse. It may be done before major surgery. And it can help you and your family be prepared in case of a major illness or injury. Advance care planning helps with making decisions at these times.       A health care proxy is a person who acts as the voice of a patient when the patient cant speak for himself or herself. The name of this role varies by state. It may be called a Durable Medical Power of  or Durable Power of  for Healthcare. It may  be called an agent, surrogate, or advocate. Or it may be called a representative or decision maker. It is an official duty that is identified by a legal document. The document also varies by state.    Why Is Advance Care Planning Important?  If a person communicates their healthcare wishes:    They will be given medical care that matches their values and goals.    Their family members will not be forced to make decisions in a crisis with no guidance.  Creating a Plan  Making an advance care plan is often done in 3 steps:    Thinking about ones wishes. To create an advance care plan, you should think about what kind of medical treatment you would want if you lose the ability to communicate. Are there any situations in which you would refuse or stop treatment? Are there therapies you would want or not want? And whom do you want to make decisions for you? There are many places to learn more about how to plan for your care. Ask your doctor or  for resources.    Picking a health care proxy. This means choosing a trusted person to speak for you only when you cant speak for yourself. When you cannot make medical decisions, your proxy makes sure the instructions in your advance care plan are followed. A proxy does not make decisions based on his or her own opinions. They must put aside those opinions and values if needed, and carry out your wishes.    Filling out the legal documents. There are several kinds of legal documents for advance care planning. Each one tells health care providers your wishes. The documents may vary by state. They must be signed and may need to be witnessed or notarized. You can cancel or change them whenever you wish. Depending on your state, the documents may include a Healthcare Proxy form, Living Will, Durable Medical Power of , Advance Directive, or others.  The Familys Role  The best help a family can give is to support their loved ones wishes. Open and honest  communication is vital. Family should express any concerns they have about the patients choices while the patient can still make decisions.    0704-5025 [employer]. [address] [place] [address], [place] [address]. All rights reserved. This information is not intended as a substitute for professional medical care. Always follow your healthcare professional's instructions.         Also, [facility] offers a free, downloadable health care directive that allows you to share your treatment choices and personal preferences if you cannot communicate your wishes. It also allows you to appoint another person (called a health care agent) to make health care decisions if you are unable to do so. You can download an advance directive by going here: [email or web address]     Patient Education   Personalized Prevention Plan  You are due for the preventive services outlined below.  Your care team is available to assist you in scheduling these services.  If you have already completed any of these items, please share that information with your care team to update in your medical record.  Health Maintenance   Topic Date Due   ? ZOSTER VACCINES (1 of 2) 02/15/1992   ? MEDICARE ANNUAL WELLNESS VISIT  01/02/2020   ? FALL RISK ASSESSMENT  01/31/2020   ? DXA SCAN  01/21/2021   ? TD 18+ HE  08/09/2022   ? ADVANCE CARE PLANNING  01/02/2024   ? LIPID  01/11/2024   ? PNEUMOCOCCAL IMMUNIZATION 65+ LOW/MEDIUM RISK  Completed   ? INFLUENZA VACCINE RULE BASED  Completed

## 2021-06-17 NOTE — PROGRESS NOTES
1. Age-related osteoporosis without current pathological fracture       The following steps were completed to comply with the REMS program for Prolia:    Reviewed information in the Medication Guide and Patient Counseling Chart, including the serious risks of Prolia  and the symptoms of each risk.    Advised patient to seek prompt medical attention if they have signs or symptoms of any of the serious risks.    Provided each patient a copy of the Medication Guide and Patient Brochure    Return in about 6 months (around 11/4/2021) for Recheck, Prolia injection.    Patient Instructions   If you decide to start Prolia, you can schedule the visit with my nurse.   We should do Prolia every 6 months. I can see you in a 6 months when its time for the second Prolia dose.  Repeat DXA scan in 1 year.    Daily calcium need is 0923-5812 mg a day from the diet and supplements.  Calcium citrate is easier to digest.  Vitamin D 2000 IU daily recommended.          Chief Complaint   Patient presents with     Discuss Prolia and side effects     Discuss Prolia and side effects       /76   Pulse 71   Wt 108 lb (49 kg)   SpO2 97%   BMI 21.45 kg/m          Any medication change in the last 6 months? no  Did you take prednisone or other immunosupressant drugs in the last 6 months   (chemo, transplant, rheum, dermatology conditions)? no  Did you have any serious infection in the last 6 months?no  Any recent hospitalizations?no  Do you plan any dental work in the next 2-3 months?no  How much calcium do you take daily from the diet and supplements?1200 mg  How much vit D do you take daily? 2000 IU  Last DXA? 1/31/2020,  Reviewed and discussed      Patient is here today for the 1st Prolia injection.   We discussed calcium and vit D daily needs today.   She completed Forteo 2 years of treatment in February.  She is very concerned about possible side effects of prolia and bisphosphonate. We discussed oral Fosamax or Actonel and  "significant GI disturbance, Reclast infusion once a year and Prolia. She will have to discuss it further with her family.        Next Prolia injection will be in 6 months.     Patient was educated on safety of Prolia utilizing Patient Counseling Chart for Healthcare Providers, as outlined by the Prolia REMS progam.         This note has been dictated using voice recognition software. Any grammatical or context distortions are unintentional and inherent to the software      Patient Active Problem List   Diagnosis     Chronic Reflux Esophagitis     Malabsorption Syndrome     Osteoporosis       Current Outpatient Medications on File Prior to Visit   Medication Sig Dispense Refill     calcium cit-mag-D3-Zn--eva 359--3.75 mg-mg-unit-mg Tab Take by mouth.       cholecalciferol, vitamin D3, (VITAMIN D3) 2,000 unit Tab Take by mouth.       omeprazole (PRILOSEC) 20 MG capsule TAKE 1 CAPSULE(20 MG) BY MOUTH DAILY 90 capsule 3     pen needle, diabetic (SURE-FINE PEN NEEDLES) 31 gauge x 3/16\" Ndle Use daily with Forteo 100 each 3     Current Facility-Administered Medications on File Prior to Visit   Medication Dose Route Frequency Provider Last Rate Last Admin     denosumab 60 mg (PROLIA 60 mg/ml)  60 mg Subcutaneous Q6 Months Joellen Christensen MD         "

## 2021-06-18 NOTE — LETTER
Letter by Joellen Christensen MD at      Author: Joellen Christensen MD Service: -- Author Type: --    Filed:  Encounter Date: 1/16/2019 Status: (Other)       Helen Graham  [address] [facility] [address]             January 16, 2019         Dear Ms. Graham,    Below are the results from your recent visit:    Resulted Orders   Comprehensive Metabolic Panel   Result Value Ref Range    Sodium 140 136 - 145 mmol/L    Potassium 4.5 3.5 - 5.0 mmol/L    Chloride 104 98 - 107 mmol/L    CO2 24 22 - 31 mmol/L    Anion Gap, Calculation 12 5 - 18 mmol/L    Glucose 84 70 - 125 mg/dL    BUN 15 8 - 28 mg/dL    Creatinine 0.82 0.60 - 1.10 mg/dL    GFR MDRD Af Amer >60 >60 mL/min/1.73m2    GFR MDRD Non Af Amer >60 >60 mL/min/1.73m2    Bilirubin, Total 0.5 0.0 - 1.0 mg/dL    Calcium 9.6 8.5 - 10.5 mg/dL    Protein, Total 8.0 6.0 - 8.0 g/dL    Albumin 3.9 3.5 - 5.0 g/dL    Alkaline Phosphatase 126 (H) 45 - 120 U/L    AST 21 0 - 40 U/L    ALT 14 0 - 45 U/L    Narrative    Fasting Glucose reference range is 70-99 mg/dL per  American Diabetes Association (ADA) guidelines.   HM2(CBC w/o Differential)   Result Value Ref Range    WBC 7.5 4.0 - 11.0 thou/uL    RBC 4.95 3.80 - 5.40 mill/uL    Hemoglobin 14.6 12.0 - 16.0 g/dL    Hematocrit 44.5 35.0 - 47.0 %    MCV 90 80 - 100 fL    MCH 29.5 27.0 - 34.0 pg    MCHC 32.8 32.0 - 36.0 g/dL    RDW 12.2 11.0 - 14.5 %    Platelets 367 140 - 440 thou/uL    MPV 6.7 (L) 7.0 - 10.0 fL   Lipid Profile   Result Value Ref Range    Triglycerides 147 <=149 mg/dL    Cholesterol 207 (H) <=199 mg/dL    LDL Calculated 123 <=129 mg/dL    HDL Cholesterol 55 >=50 mg/dL    Patient Fasting > 8hrs? Yes    Urinalysis   Result Value Ref Range    Color, UA Yellow Colorless, Yellow, Straw, Light Yellow    Clarity, UA Clear Clear    Glucose, UA Negative Negative    Bilirubin, UA Negative Negative    Ketones, UA Negative Negative    Specific Gravity, UA 1.025 1.005 - 1.030    Blood, UA Negative Negative    pH, UA  5.0 5.0 - 8.0    Protein, UA Negative Negative mg/dL    Urobilinogen, UA 0.2 E.U./dL 0.2 E.U./dL, 1.0 E.U./dL    Nitrite, UA Negative Negative    Leukocytes, UA Trace (!) Negative    Bacteria, UA None Seen None Seen hpf    RBC, UA None Seen None Seen, 0-2 hpf    WBC, UA 0-5 None Seen, 0-5 hpf    Squam Epithel, UA 0-5 None Seen, 0-5 lpf   Vitamin D, Total (25-Hydroxy)   Result Value Ref Range    Vitamin D, Total (25-Hydroxy) 22.0 (L) 30.0 - 80.0 ng/mL    Narrative    Deficiency <10.0 ng/mL  Insufficiency 10.0-29.9 ng/mL  Sufficiency 30.0-80.0 ng/mL  Toxicity (possible) >100.0 ng/mL   Thyroid Stimulating Hormone (TSH)   Result Value Ref Range    TSH 2.40 0.30 - 5.00 uIU/mL                   You have very low vit D. Rx for ergocalciferol is sent and you also need to take 2000 IU of vit D3 over                the counter daily.    Please call with questions or contact us using [employer].    Sincerely,        Electronically signed by Joellen Christensen MD

## 2021-06-18 NOTE — LETTER
Letter by Ward Cloud CNP at      Author: Ward Cloud CNP Service: -- Author Type: --    Filed:  Encounter Date: 1/16/2019 Status: (Other)       Helen Graham  [address] [address]             January 16, 2019         Dear MsTherese Santos,    Below are the results from your recent visit:    Resulted Orders   Occult Blood(ICT)   Result Value Ref Range    Fecal Occult Bld (ICT) 1 Negative Negative    Fecal Occult Blood (ICT) 2 Negative Negative    Fecal Occult Blood (ICT) 3 Negative Negative       Your fecal occult testing is negative. Repeat testing in one year.     Please call with questions or contact us using [employer].    Sincerely,        Electronically signed by Ward Cloud CNP

## 2021-06-18 NOTE — LETTER
Letter by Joellen Christensen MD at      Author: Joellen Christensen MD Service: -- Author Type: --    Filed:  Encounter Date: 1/11/2019 Status: (Other)       Helen Graham  [address] [place] [address]             January 11, 2019         Dear Ms. Graham,    Below are the results from your recent visit:    Resulted Orders   Comprehensive Metabolic Panel   Result Value Ref Range    Sodium 140 136 - 145 mmol/L    Potassium 4.5 3.5 - 5.0 mmol/L    Chloride 104 98 - 107 mmol/L    CO2 24 22 - 31 mmol/L    Anion Gap, Calculation 12 5 - 18 mmol/L    Glucose 84 70 - 125 mg/dL    BUN 15 8 - 28 mg/dL    Creatinine 0.82 0.60 - 1.10 mg/dL    GFR MDRD Af Amer >60 >60 mL/min/1.73m2    GFR MDRD Non Af Amer >60 >60 mL/min/1.73m2    Bilirubin, Total 0.5 0.0 - 1.0 mg/dL    Calcium 9.6 8.5 - 10.5 mg/dL    Protein, Total 8.0 6.0 - 8.0 g/dL    Albumin 3.9 3.5 - 5.0 g/dL    Alkaline Phosphatase 126 (H) 45 - 120 U/L    AST 21 0 - 40 U/L    ALT 14 0 - 45 U/L    Narrative    Fasting Glucose reference range is 70-99 mg/dL per  American Diabetes Association (ADA) guidelines.   HM2(CBC w/o Differential)   Result Value Ref Range    WBC 7.5 4.0 - 11.0 thou/uL    RBC 4.95 3.80 - 5.40 mill/uL    Hemoglobin 14.6 12.0 - 16.0 g/dL    Hematocrit 44.5 35.0 - 47.0 %    MCV 90 80 - 100 fL    MCH 29.5 27.0 - 34.0 pg    MCHC 32.8 32.0 - 36.0 g/dL    RDW 12.2 11.0 - 14.5 %    Platelets 367 140 - 440 thou/uL    MPV 6.7 (L) 7.0 - 10.0 fL   Lipid Profile   Result Value Ref Range    Triglycerides 147 <=149 mg/dL    Cholesterol 207 (H) <=199 mg/dL    LDL Calculated 123 <=129 mg/dL    HDL Cholesterol 55 >=50 mg/dL    Patient Fasting > 8hrs? Yes    Urinalysis   Result Value Ref Range    Color, UA Yellow Colorless, Yellow, Straw, Light Yellow    Clarity, UA Clear Clear    Glucose, UA Negative Negative    Bilirubin, UA Negative Negative    Ketones, UA Negative Negative    Specific Gravity, UA 1.025 1.005 - 1.030    Blood, UA Negative Negative    pH, UA  5.0 5.0 - 8.0    Protein, UA Negative Negative mg/dL    Urobilinogen, UA 0.2 E.U./dL 0.2 E.U./dL, 1.0 E.U./dL    Nitrite, UA Negative Negative    Leukocytes, UA Trace (!) Negative    Bacteria, UA None Seen None Seen hpf    RBC, UA None Seen None Seen, 0-2 hpf    WBC, UA 0-5 None Seen, 0-5 hpf    Squam Epithel, UA 0-5 None Seen, 0-5 lpf   Thyroid Stimulating Hormone (TSH)   Result Value Ref Range    TSH 2.40 0.30 - 5.00 uIU/mL       Stable results.    Please call with questions or contact us using [employer].    Sincerely,        Electronically signed by Joellen Christensen MD

## 2021-06-18 NOTE — LETTER
Letter by Joellen Christensen MD at      Author: Joellen Christensen MD Service: -- Author Type: --    Filed:  Encounter Date: 1/16/2019 Status: (Other)       Helen Graham  [address] [facility] [address]             January 16, 2019         Dear Ms. Graham,    Below are the results from your recent visit:    Resulted Orders   Occult Blood(ICT)   Result Value Ref Range    Fecal Occult Bld (ICT) 1 Negative Negative    Fecal Occult Blood (ICT) 2 Negative Negative    Fecal Occult Blood (ICT) 3 Negative Negative     Normal result.    Please call with questions or contact us using [employer].    Sincerely,        Electronically signed by Joellen Christensen MD

## 2021-06-19 NOTE — LETTER
Letter by Hortencia Davis SW at      Author: Hortencia Davis SW Service: -- Author Type: --    Filed:  Encounter Date: 6/28/2019 Status: (Other)       July 3, 2019    Important [employer] Information    DARREL BOWERS  [address] [facility] [address]  I've Tried to Contact You  Dear Darrel,  My name is ADELAIDE Maguire, and I am your Care Coordinator. I have been trying to contact you, but have not been able to reach you.  As a Care Coordinator, I am here to help. My role is to make sure that your health plan is working for you. I am available to:     Review your health care needs with you over the phone or in-person     Provide support for and information about covered services or supplies to help keep you safe and healthy in your home    Answer questions about your insurance     Help you find a provider, such as a doctor or dentist, to meet your unique needs  I can also help you schedule a free physical at your clinic. To schedule an appointment, please call me at [phone] Monday-Friday between 8am-5pm TTY/TDD: 263.    Questions?  Please call me at the phone number listed above. If youd like, a friend or family member may call for you.  For general questions, call [employer] Customer Service at [phone] or [phone] (toll free) from 8 a.m. - 8 p.m. Central, seven days a week. Access to representatives may be limited at times. TTY/TDD: 711.  Sincerely,      ADELAIDE Maguire    E-mail: [email or web address]  Phone: [phone]    Care Manager  [place] [employer]          cc: member records                                Civil Rights Notice  Discrimination is against the law. [employer] does not discriminate on the basis of any of the following:    Race    Color    National Origin    Creed    Rastafarian    Age    Public Assistance Status    Receipt of Health Care Services    Disability (including physical or mental impairment)    Sex (including sex stereotypes and gender identity)    Marital  Status    Political Beliefs    Medical Condition    Genetic Information    Sexual Orientation    Claims Experience    Medical History    Health Status    Auxiliary Aids and Services:  [employer] provides auxiliary aids and services, like qualified interpreters or information in accessible formats, free of charge and in a timely manner, to ensure an equal opportunity to participate in our health care programs. Contact [employer] Customer Service at [employer]/contact medicaid or call [phone] (toll free); TTY:711 or at [employer]/contactmedicaid.    Language Assistance Services:  [employer] provides translated documents and spoken language interpreting, free of charge and in a timely manner, when language assistance services are necessary to ensure limited English speakers have meaningful access to our information and services. Contact [employer] at [phone] (toll free); TTY: 136 or [employer]/contactmedicaid.     Civil Rights Complaints  You have the right to file a discrimination complaint if you believe you were treated in a discriminatory way by [employer]. You may contact any of the following four agencies directly to file a discrimination complaint.    U.S. Department of Health and Human Services Office for Civil Rights (OCR)  You have the right to file a complaint with the OCR, a federal agency, if you believe you have been discriminated against because of any of the following:    Race    Disability    Color    Sex    National Origin    Age      Contact the OCR directly to file a complaint:         Director         U.S. Department of Health and Human Services Office for Civil Rights         [address] [place] [address], [place] [address]         [phone] (Voice)         [phone] (TDD)         Complaint Portal - https://ocrportal.hhs.gov/ocr/portal/lobby.jsf     [place] Department of Human Rights ([facility])  In [place], you have the right to file a complaint with  the MDHR if you believe you have been discriminated against because of any of the following:      Race    Color    National Origin    Yazdanism    Creed    Sex    Sexual Orientation    Marital Status    Public Assistance Status    Disability    Contact the MDHR directly to file a complaint:         [place] Department of Human Rights         [facility], [address] [place] [address]         [phone] (voice)          [phone] (toll free)         711 or [phone] ([place] Relay)         [phone] (Fax)         [email or web address] (Email)     [place] Department of Human Services (DHS)  You have the right to file a complaint with [facility] if you believe you have been discriminated against in our health care programs because of any of the following:    Race    Color    National Origin    Creed    Yazdanism    Age    Public Assistance Status    Receipt of Health Care Services    Disability (including physical or mental impairment)    Sex (including sex stereotypes and gender identity)    Marital Status    Political Beliefs    Medical Condition    Genetic Information    Sexual Orientation    Claims Experience    Medical History    Health Status    Complaints must be in writing and filed within 180 days of the date you discovered the alleged discrimination. The complaint must contain your name and address and describe the discrimination you are complaining about. After we get your complaint, we will review it and notify you in writing about whether we have authority to investigate. If we do, we will investigate the complaint.      [facility] will notify you in writing of the investigations outcome. You have a right to appeal the outcome if you disagree with the decision. To appeal, you must send a written request to have [facility] review the investigation outcome period. Be brief and state why you disagree with the decision. Include additional information you think is important.      If you file a  complaint in this way, the people who work for the agency named in the complaint cannot retaliate against you. This means they cannot punish you in any way for filing a complaint. Filing a complaint in this way does not stop you from seeking out other legal or administration actions.     Contact DHS directly to file a discrimination complaint:        Civil Rights Coordinator        [facility]        Equal Opportunity and Access Division        [address] [address]        [place] [address] [phone] (voice) or use your preferred relay service     [employer] Complaint Notice   You have the right to file a complaint with [employer] if you believe you have been discriminated against because of any of the following:       Medical condition    Health status    Receipt of health care services    Claims experience    Medical history    Genetic information    Disability (including mental or physical impairment)    Marital status    Age    Sex (including sex stereotypes and gender identity)    Sexual orientation    National origin    Race    Color    Pentecostalism    Creed    Public assistance status    Political beliefs    You can file a complaint and ask for help in filing a complaint in person or by mail, phone, fax, or email at:     [employer] Civil Rights Coordinator  [facility]  [address], [place] [address] [phone] (voice and fax) or TTQ:940  Email: [email or web address]    American Indians can continue to use [facility] and [facility] ([facility]) clinics. We will not require prior approval or impose any conditions for you to get services at these clinics. For elders age 65 years and older this includes Elderly Waiver (EW) services accessed through the [place]. If a doctor or other provider in a [facility] or [facility] clinic refers you to a provider in our network, we will not require you to see your primary care provider prior to the referral.    For  accessible formats of this publication or assistance with equal or access to our services, visit [email or web address], or call [phone] (toll free) or use your preferred relay service.

## 2021-06-19 NOTE — LETTER
Letter by Hortencia Davis SW at      Author: Hortencia Davis SW Service: -- Author Type: --    Filed:  Encounter Date: 12/2/2019 Status: Signed       Edwin Unruly Â® Advance Care Planning       Helenfrancisco Grahma  [address] [facility] [address]      Dear Helen,    You shared with me your interest in receiving information on Advance Care Planning and Health Care Directives. Discussing and making decisions about this part of our health is very important.  A Health Care Directive is a written document that outlines your goals, values, beliefs and choices for health care and medical treatment in the event you are unable to speak for yourself.     We greatly value the opportunity to assist you in documenting your choices and to honor your   wishes. Weve enclosed resources to help you get started thinking about your values and goals. We have several options for additional resources:       Health Care Directives and Advance Care Planning resources can be viewed and printed   for free at our web site:  [email or web address].       Free group classes on Advance Care Planning and completing a Health Care Directive are available at multiple locations and times. These classes are led by trained staff who will provide information and guide you through a Health Care Directive.  They can also review, notarize and add your Health Care Directive to your medical record. [place] for a class at [email or web address] or by calling [employer] at [phone] or toll free [phone].      COPIES of completed Health Care Directives can be brought or mailed to any of our   locations, including the address listed below. You can also email a copy to [email or web address] .      Email or call me at the contact information listed below for questions, assistance, or to   make an appointment to discuss creating a Health Care Directive. You can also contact   our [employer] Department for questions or  assistance.       Sincerely,     Hortencia Davis, [facility]  [phone]

## 2021-06-19 NOTE — LETTER
Letter by Hortencia Davis SW at      Author: Hortencia Davis SW Service: -- Author Type: --    Filed:  Encounter Date: 12/2/2019 Status: Signed       December 2, 2019    Important [employer] Information    DARREL BOWERS  [address] [facility] [address]  Your Care Plan  Dear Darrel,  When we spoke recently, I promised to send you a Care Plan. The plan enclosed is a summary of our discussion. It includes the steps we agreed would help you meet your health goals. In addition, I can help you with:  Hrbxsiq-Q-RjaeUG  This program is available to members who need a ride to medical and dental visits. To schedule a ride, call [phone] or [phone] (toll free). TTY/TTD: 711. You can call 8 a.m. to 8 p.m. Seven days a week. Access to a representative may be limited at times.      VGTel   The VGTel program empowers you to improve your health through education and exercise. To learn more, visit [employer], or call [employer] at [phone] (toll free) (TTY:711) from 7 a.m. - 7 p.m. Central Time, Monday-Friday.  Health Care Directive   This form helps you outline your health care wishes. You can request a form from me and I will answer any questions you have before you discuss it with your doctor.   Annual Physical  Take a key step on your path to good health and set up an annual physical at your clinic.  Questions?  Call me at [phone] Monday-Friday between 8am and 5pm.  TTY/TTD: 711. As we discussed, I plan to be in touch with you again in 6 months to follow up via phone.  Sincerely,      ADELAIDE Maguire    E-mail: [email or web address]  Phone: [phone]    Care Manager  [facility]          cc: member records                Civil Rights Notice  Discrimination is against the law. [employer] does not discriminate on the basis of any of the following:    Race    Color    National Origin    Creed    Gnosticist    Age    Public Assistance Status    Receipt  of Health Care Services    Disability (including physical or mental impairment)    Sex (including sex stereotypes and gender identity)    Marital Status    Political Beliefs    Medical Condition    Genetic Information    Sexual Orientation    Claims Experience    Medical History    Health Status    Auxiliary Aids and Services:  [employer] provides auxiliary aids and services, like qualified interpreters or information in accessible formats, free of charge and in a timely manner, to ensure an equal opportunity to participate in our health care programs. Contact [employer] Customer Service at [employer]/contact medicaid or call [phone] (toll free); TTY:718 or at [employer]/contactmedicaid.    Language Assistance Services:  [employer] provides translated documents and spoken language interpreting, free of charge and in a timely manner, when language assistance services are necessary to ensure limited English speakers have meaningful access to our information and services. Contact [employer] at [phone] (toll free); TTY: 452 or [employer]/contactmedicaid.     Civil Rights Complaints  You have the right to file a discrimination complaint if you believe you were treated in a discriminatory way by [employer]. You may contact any of the following four agencies directly to file a discrimination complaint.    U.S. Department of Health and Human Services Office for Civil Rights (OCR)  You have the right to file a complaint with the OCR, a federal agency, if you believe you have been discriminated against because of any of the following:    Race    Disability    Color    Sex    National Origin    Age      Contact the OCR directly to file a complaint:         Director         U.S. Department of Health and Human Services Office for Civil Rights         [address] [place] [address], [place] [address] [phone] (Voice)         [phone] (TDD)         Complaint Portal -  https://ocrportal.hhs.gov/ocr/portal/lobby.jsf     [place] Department of Human Rights (MDHR)  In [place], you have the right to file a complaint with the MDHR if you believe you have been discriminated against because of any of the following:      Race    Color    National Origin    Adventism    Creed    Sex    Sexual Orientation    Marital Status    Public Assistance Status    Disability    Contact the MDHR directly to file a complaint:         [place] Department of Human Rights         [facility], [address] [place] [address]         [phone] (voice)          [phone] (toll free)         [phone] or [phone] ([place] Relay)         [phone] (Fax)         [email or web address] (Email)     [place] Department of Human Services (DHS)  You have the right to file a complaint with [facility] if you believe you have been discriminated against in our health care programs because of any of the following:    Race    Color    National Origin    Creed    Adventism    Age    Public Assistance Status    Receipt of Health Care Services    Disability (including physical or mental impairment)    Sex (including sex stereotypes and gender identity)    Marital Status    Political Beliefs    Medical Condition    Genetic Information    Sexual Orientation    Claims Experience    Medical History    Health Status    Complaints must be in writing and filed within 180 days of the date you discovered the alleged discrimination. The complaint must contain your name and address and describe the discrimination you are complaining about. After we get your complaint, we will review it and notify you in writing about whether we have authority to investigate. If we do, we will investigate the complaint.      [facility] will notify you in writing of the investigations outcome. You have a right to appeal the outcome if you disagree with the decision. To appeal, you must send a written request to have [facility] review the  investigation outcome period. Be brief and state why you disagree with the decision. Include additional information you think is important.      If you file a complaint in this way, the people who work for the agency named in the complaint cannot retaliate against you. This means they cannot punish you in any way for filing a complaint. Filing a complaint in this way does not stop you from seeking out other legal or administration actions.     Contact DHS directly to file a discrimination complaint:        Civil Rights Coordinator        [place] Department of Human Services        Equal Opportunity and Access Division        [address] [address]        [place] [address] [phone] (voice) or use your preferred relay service     [facility] Complaint Notice   You have the right to file a complaint with [employer] if you believe you have been discriminated against because of any of the following:       Medical condition    Health status    Receipt of health care services    Claims experience    Medical history    Genetic information    Disability (including mental or physical impairment)    Marital status    Age    Sex (including sex stereotypes and gender identity)    Sexual orientation    National origin    Race    Color    Samaritan    Creed    Public assistance status    Political beliefs    You can file a complaint and ask for help in filing a complaint in person or by mail, phone, fax, or email at:     [facility] Civil Rights Coordinator  [facility]  [address], [place] [address] [phone] (voice and fax) or TTE:223  Email: [email or web address]    American Indians can continue to use [facility] and [facility] ([facility]) clinics. We will not require prior approval or impose any conditions for you to get services at these clinics. For elders age 65 years and older this includes Elderly Waiver (EW) services accessed through the [place]. If a doctor or other  provider in a [facility] or [facility] clinic refers you to a provider in our network, we will not require you to see your primary care provider prior to the referral.    For accessible formats of this publication or assistance with equal or access to our services, visit [employer], or call [phone] (toll free) or use your preferred relay service.

## 2021-06-20 NOTE — LETTER
Letter by Hortencia Davis SW at      Author: Hortencia Davis SW Service: -- Author Type: --    Filed:  Encounter Date: 10/1/2020 Status: (Other)       October 1, 2020    Important [employer] Information    DARREL BOWERS  [address] [facility] [address]  Your Care Plan  Dear Darrel,  When we spoke recently, I promised to send you a Care Plan. The plan enclosed is a summary of our discussion. It includes the steps we agreed would help you meet your health goals. In addition, I can help you with:  Kurhvaf-Y-RxubSW  This program is available to members who need a ride to medical and dental visits. To schedule a ride, call [phone] or [phone] (toll free). TTY/TTD: 711. You can call 8 a.m. to 8 p.m. Seven days a week. Access to a representative may be limited at times.      Resonate   The Resonate program empowers you to improve your health through education and exercise. To learn more, visit [employer], or call [employer] at [phone] (toll free) (TTY:711) from 7 a.m. - 7 p.m. Central Time, Monday-Friday.  Health Care Directive   This form helps you outline your health care wishes. You can request a form from me and I will answer any questions you have before you discuss it with your doctor.   Annual Physical  Take a key step on your path to good health and set up an annual physical at your clinic.  Questions?  Call me at [phone] Monday-Friday between 8am and 5pm.  TTY/TTD: 711. As we discussed, I plan to be in touch with you again in 6 months to follow up via phone.  Sincerely,      ADELAIDE Maguire    E-mail: [email or web address]  Phone: [phone]    Care Manager  [facility]          cc: member records                Civil Rights Notice  Discrimination is against the law. [employer] does not discriminate on the basis of any of the following:    Race    Color    National Origin    Creed    Sikhism    Age    Public Assistance Status    Receipt  of Health Care Services    Disability (including physical or mental impairment)    Sex (including sex stereotypes and gender identity)    Marital Status    Political Beliefs    Medical Condition    Genetic Information    Sexual Orientation    Claims Experience    Medical History    Health Status    Auxiliary Aids and Services:  [employer] provides auxiliary aids and services, like qualified interpreters or information in accessible formats, free of charge and in a timely manner, to ensure an equal opportunity to participate in our health care programs. Contact [employer] Customer Service at [employer]/contact medicaid or call [phone] (toll free); TTY:713 or at [employer]/contactmedicaid.    Language Assistance Services:  [employer] provides translated documents and spoken language interpreting, free of charge and in a timely manner, when language assistance services are necessary to ensure limited English speakers have meaningful access to our information and services. Contact [employer] at [phone] (toll free); TTY: 739 or [employer]/contactmedicaid.     Civil Rights Complaints  You have the right to file a discrimination complaint if you believe you were treated in a discriminatory way by [facility]. You may contact any of the following four agencies directly to file a discrimination complaint.    U.S. Department of Health and Human Services Office for Civil Rights (OCR)  You have the right to file a complaint with the OCR, a federal agency, if you believe you have been discriminated against because of any of the following:    Race    Disability    Color    Sex    National Origin    Age      Contact the OCR directly to file a complaint:         Director         U.S. Department of Health and Human Services Office for Civil Rights         [address] [place] [address], [place] [address] [phone] (Voice)         [phone] (TDD)         Complaint Portal -  https://ocrportal.hhs.gov/ocr/portal/lobby.jsf     [place] Department of Human Rights (MDHR)  In [place], you have the right to file a complaint with the MDHR if you believe you have been discriminated against because of any of the following:      Race    Color    National Origin    Roman Catholic    Creed    Sex    Sexual Orientation    Marital Status    Public Assistance Status    Disability    Contact the MDHR directly to file a complaint:         [place] Department of Human Rights         [facility], [address] [place] [address]         [phone] (voice)          [phone] (toll free)         [phone] or [phone] ([place] Relay)         [phone] (Fax)         [email or web address]. (Email)     [place] Department of Human Services (DHS)  You have the right to file a complaint with [facility] if you believe you have been discriminated against in our health care programs because of any of the following:    Race    Color    National Origin    Creed    Roman Catholic    Age    Public Assistance Status    Receipt of Health Care Services    Disability (including physical or mental impairment)    Sex (including sex stereotypes and gender identity)    Marital Status    Political Beliefs    Medical Condition    Genetic Information    Sexual Orientation    Claims Experience    Medical History    Health Status    Complaints must be in writing and filed within 180 days of the date you discovered the alleged discrimination. The complaint must contain your name and address and describe the discrimination you are complaining about. After we get your complaint, we will review it and notify you in writing about whether we have authority to investigate. If we do, we will investigate the complaint.      [facility] will notify you in writing of the investigations outcome. You have a right to appeal the outcome if you disagree with the decision. To appeal, you must send a written request to have [facility] review the  investigation outcome period. Be brief and state why you disagree with the decision. Include additional information you think is important.      If you file a complaint in this way, the people who work for the agency named in the complaint cannot retaliate against you. This means they cannot punish you in any way for filing a complaint. Filing a complaint in this way does not stop you from seeking out other legal or administration actions.     Contact DHS directly to file a discrimination complaint:        Civil Rights Coordinator        [place] Department of Human Services        Equal Opportunity and Access Division        [address] [address]        [place] [address] [phone] (voice) or use your preferred relay service     [facility] Complaint Notice   You have the right to file a complaint with [employer] if you believe you have been discriminated against because of any of the following:       Medical condition    Health status    Receipt of health care services    Claims experience    Medical history    Genetic information    Disability (including mental or physical impairment)    Marital status    Age    Sex (including sex stereotypes and gender identity)    Sexual orientation    National origin    Race    Color    Orthodox    Creed    Public assistance status    Political beliefs    You can file a complaint and ask for help in filing a complaint in person or by mail, phone, fax, or email at:     [facility] Civil Rights Coordinator  [facility]  [address], [place] [address] [phone] (voice and fax) or TTY:151  Email: [email or web address]    American Indians can continue to use [facility] and [facility] ([facility]) clinics. We will not require prior approval or impose any conditions for you to get services at these clinics. For elders age 65 years and older this includes Elderly Waiver (EW) services accessed through the [place]. If a doctor or other  provider in a [facility] or [facility] clinic refers you to a provider in our network, we will not require you to see your primary care provider prior to the referral.    For accessible formats of this publication or assistance with equal or access to our services, visit [employer]med[employer], or call [phone] (toll free) or use your preferred relay service.

## 2021-06-21 NOTE — LETTER
Letter by Hortencia Davis SW at      Author: Hortencia Davis SW Service: -- Author Type: --    Filed:  Encounter Date: 3/31/2021 Status: (Other)       April 5, 2021    Important [employer] Information    DARREL BOWERS  [address] [facility] [address]  I've Tried to Contact You  Dear Darrel,  My name is ADELAIDE Maguire, and I am your Care Coordinator. I have been trying to contact you, but have not been able to reach you.  As a Care Coordinator, I am here to help. My role is to make sure that your health plan is working for you. I am available to:     Review your health care needs with you over the phone or in-person     Provide support for and information about covered services or supplies to help keep you safe and healthy in your home    Answer questions about your insurance     Help you find a provider, such as a doctor or dentist, to meet your unique needs  I can also help you schedule a free physical at your clinic. To schedule an appointment, please call me at [phone] Monday-Friday between 8am-5pm TTY/TDD: 621.    Questions?  Please call me at the phone number listed above. If youd like, a friend or family member may call for you.  For general questions, call [employer] Customer Service at [phone] or [phone] (toll free) from 8 a.m. - 8 p.m. Central, seven days a week. Access to representatives may be limited at times. TTY/TDD: 711.  Sincerely,      ADELAIDE Maguire    E-mail: [email or web address]  Phone: [phone]    Care Manager  [facility]          cc: member records                                Civil Rights Notice  Discrimination is against the law. [employer] does not discriminate on the basis of any of the following:    Race    Color    National Origin    Creed    Islam    Age    Public Assistance Status    Receipt of Health Care Services    Disability (including physical or mental impairment)    Sex (including sex stereotypes and gender identity)    Marital  Status    Political Beliefs    Medical Condition    Genetic Information    Sexual Orientation    Claims Experience    Medical History    Health Status    Auxiliary Aids and Services:  [employer] provides auxiliary aids and services, like qualified interpreters or information in accessible formats, free of charge and in a timely manner, to ensure an equal opportunity to participate in our health care programs. Contact [employer] Customer Service at "[employer]/contact medicaid or call [phone] (toll free); TTY:711 or at "[employer]/contactmedicaid.    Language Assistance Services:  [employer] provides translated documents and spoken language interpreting, free of charge and in a timely manner, when language assistance services are necessary to ensure limited English speakers have meaningful access to our information and services. Contact [employer] at [phone] (toll free); TTY: 679 or "[employer]/contactmedicaid.     Civil Rights Complaints  You have the right to file a discrimination complaint if you believe you were treated in a discriminatory way by [employer]. You may contact any of the following four agencies directly to file a discrimination complaint.    U.S. Department of Health and Human Services Office for Civil Rights (OCR)  You have the right to file a complaint with the OCR, a federal agency, if you believe you have been discriminated against because of any of the following:    Race    Disability    Color    Sex    National Origin    Age      Contact the OCR directly to file a complaint:         Director         U.S[place] Department of Health and Human Services Office for Civil Rights         [address] [place] [address], [place] [address]         [phone] (Voice)         [phone] (TDD)         Complaint Portal - https://ocrportal.hhs.gov/ocr/portal/lobby.jsf     [place] Department of Human Rights ([facility])  In [place], you have the right to file a complaint with  the MDHR if you believe you have been discriminated against because of any of the following:      Race    Color    National Origin    Synagogue    Creed    Sex    Sexual Orientation    Marital Status    Public Assistance Status    Disability    Contact the MDHR directly to file a complaint:         [place] Department of Human Rights         [facility], [address] [place] [address]         [phone] (voice)          [phone] (toll free)         711 or [phone] ([place] Relay)         [phone] (Fax)         [email or web address] (Email)     [place] Department of Human Services (DHS)  You have the right to file a complaint with [facility] if you believe you have been discriminated against in our health care programs because of any of the following:    Race    Color    National Origin    Creed    Synagogue    Age    Public Assistance Status    Receipt of Health Care Services    Disability (including physical or mental impairment)    Sex (including sex stereotypes and gender identity)    Marital Status    Political Beliefs    Medical Condition    Genetic Information    Sexual Orientation    Claims Experience    Medical History    Health Status    Complaints must be in writing and filed within 180 days of the date you discovered the alleged discrimination. The complaint must contain your name and address and describe the discrimination you are complaining about. After we get your complaint, we will review it and notify you in writing about whether we have authority to investigate. If we do, we will investigate the complaint.      [facility] will notify you in writing of the investigations outcome. You have a right to appeal the outcome if you disagree with the decision. To appeal, you must send a written request to have [facility] review the investigation outcome period. Be brief and state why you disagree with the decision. Include additional information you think is important.      If you file a  complaint in this way, the people who work for the agency named in the complaint cannot retaliate against you. This means they cannot punish you in any way for filing a complaint. Filing a complaint in this way does not stop you from seeking out other legal or administration actions.     Contact DHS directly to file a discrimination complaint:        Civil Rights Coordinator        [facility] [place] of Human Services        Equal Opportunity and Access Division        [address] [address]        [place] [address] [phone] (voice) or use your preferred relay service     [facility] Complaint Notice   You have the right to file a complaint with [employer] if you believe you have been discriminated against because of any of the following:       Medical condition    Health status    Receipt of health care services    Claims experience    Medical history    Genetic information    Disability (including mental or physical impairment)    Marital status    Age    Sex (including sex stereotypes and gender identity)    Sexual orientation    National origin    Race    Color    Sabianism    Creed    Public assistance status    Political beliefs    You can file a complaint and ask for help in filing a complaint in person or by mail, phone, fax, or email at:     [facility] Civil Rights Coordinator  [employer] [facility]  [address], [place] [address] [phone] (voice and fax) or TTL:272  Email: [email or web address]    American Indians can continue to use [facility] and [facility] ([facility]) clinics. We will not require prior approval or impose any conditions for you to get services at these clinics. For elders age 65 years and older this includes Elderly Waiver (EW) services accessed through the [place]. If a doctor or other provider in a [facility] or [facility] clinic refers you to a provider in our network, we will not require you to see your primary care provider prior to the referral.    For  accessible formats of this publication or assistance with equal or access to our services, visit [email or web address], or call [phone] (toll free) or use your preferred relay service.

## 2021-06-22 NOTE — PROGRESS NOTES
Assessment and Plan:       1. Routine health maintenance    - Lipid Profile; Future  - Mammo Screening Bilateral; Future  - Occult Blood, Fecal; Future    2. Chronic Reflux Esophagitis  Taking omeprazole as needed.    3. Malabsorption Syndrome  Weight is stable.   - Comprehensive Metabolic Panel; Future    4. Blood In Urine  No reported symptoms now, this is info from her history.  - HM2(CBC w/o Differential); Future  - Urinalysis; Future    5. Osteoporosis  Never had a treatment.  - DXA Bone Density Scan; Future  - Vitamin D, Total (25-Hydroxy); Future    6. Routine general medical examination at a health care facility  She refused colonoscopy.    7. Encounter for screening mammogram for malignant neoplasm of breast     - Mammo Screening Bilateral; Future     8. Elevated BP - will check at home and follow up in 2 weeks.    The patient's current medical problems were reviewed.    I have had an Advance Directives discussion with the patient.  The following health maintenance schedule was reviewed with the patient and provided in printed form in the after visit summary:   Health Maintenance   Topic Date Due     ZOSTER VACCINES (1 of 2) 02/15/1992     DXA SCAN  02/15/2007     PNEUMOCOCCAL CONJUGATE VACCINE FOR ADULTS (PCV13 OR PREVNAR)  02/15/2007     FALL RISK ASSESSMENT  02/15/2007     TD 18+ HE  08/09/2022     ADVANCE DIRECTIVES DISCUSSED WITH PATIENT  01/02/2024     PNEUMOCOCCAL POLYSACCHARIDE VACCINE AGE 65 AND OVER  Completed     INFLUENZA VACCINE RULE BASED  Completed        Subjective:   Chief Complaint: Helen Graham is an 78 y.o. female here for a Welcome to Medicare visit.   HPI:  Acute and chronic medical problems discussed as above.    Review of Systems:    Please see above.  The rest of the review of systems are negative for all systems.    Patient Care Team:  Joellen Christensen MD as PCP - General (Internal Medicine)  Hortencia Davis SW as Lead Care Coordinator (Clinic Care Coordination)     Patient  "Active Problem List   Diagnosis     Chronic Reflux Esophagitis     Malabsorption Syndrome     Osteoporosis     No past medical history on file.   Past Surgical History:   Procedure Laterality Date     OK APPENDECTOMY      Description: Appendectomy;  Recorded: 08/09/2012;     OK TOTAL ABDOM HYSTERECTOMY      Description: Hysterectomy;  Recorded: 08/09/2012;      No family history on file.   Social History     Socioeconomic History     Marital status:      Spouse name: Not on file     Number of children: Not on file     Years of education: Not on file     Highest education level: Not on file   Social Needs     Financial resource strain: Not on file     Food insecurity - worry: Not on file     Food insecurity - inability: Not on file     Transportation needs - medical: Not on file     Transportation needs - non-medical: Not on file   Occupational History     Not on file   Tobacco Use     Smoking status: Never Smoker     Smokeless tobacco: Never Used   Substance and Sexual Activity     Alcohol use: No     Frequency: Never     Drug use: No     Sexual activity: Not on file   Other Topics Concern     Not on file   Social History Narrative     Not on file       Current Outpatient Medications   Medication Sig Dispense Refill     b complex vitamins tablet Take 1 tablet by mouth daily.       calcium carbonate-vitamin D3 500 mg(1,250mg) -125 unit per tablet Take 1 capsule by mouth daily.       CARBOXYMETHYL-GLY-[id number] OPHT Administer 1 drop to both eyes 4 (four) times a day.       diclofenac sodium (VOLTAREN) 1 % Gel Apply topically as needed.       omeprazole (PRILOSEC) 20 MG capsule Take 20 mg by mouth daily.       UNABLE TO FIND Med Name: Rhydfen Gel       No current facility-administered medications for this visit.       Objective:   Vital Signs:   Visit Vitals  /72 (Patient Site: Right Arm, Patient Position: Sitting, Cuff Size: Adult Regular)   Pulse 68   Ht 4' 11.25\" (1.505 m)   Wt 103 lb 6.4 oz (46.9 kg) "   BMI 20.71 kg/m           VisionScreening:   Visual Acuity Screening    Right eye Left eye Both eyes   Without correction: 20/160 20/100 20/100   With correction: 20/32 20/80 20/25        PHYSICAL EXAM  General Appearance: Alert, cooperative, no distress, appears stated age.  Head: Normocephalic, without obvious abnormality, atraumatic  Eyes: PERRL, conjunctiva/corneas clear, EOM's intact  Ears: Normal TM's and external ear canals, both ears  Nose: Nares normal, septum midline,mucosa normal, no drainage  Throat: Lips, mucosa, and tongue normal; teeth and gums normal  Neck: Supple, symmetrical, trachea midline, no adenopathy;  thyroid: not enlarged, symmetric, no tenderness/mass/nodules; no carotid bruit or JVD  Back: Symmetric, no curvature, ROM normal, no CVA tenderness.  Lungs: Clear to auscultation bilaterally, respirations unlabored.  Breasts: No breast masses, tenderness, asymmetry, or nipple discharge.  Heart: Regular rate and rhythm, S1 and S2 normal, no murmur, rub, or gallop.  Abdomen: Soft, non-tender, bowel sounds active all four quadrants,  no masses, no organomegaly.  Pelvic:declined  Extremities: Extremities normal, atraumatic, no cyanosis or edema.  Skin: Skin color, texture, turgor normal, no rashes or lesions.  Lymph nodes: Cervical, supraclavicular, and axillary nodes normal.  Neurologic: No focal neurological findings.      Assessment Results 1/2/2019   Activities of Daily Living No help needed   Instrumental Activities of Daily Living 2-4 - Moderate impairment   Get Up and Go Score Less than 12 seconds   Mini Cog Total Score 2   Some recent data might be hidden     A Mini Cog score of 0-2 suggests the possibility of dementia, score of 3-5 suggests no dementia. Patient use English as a second language.    Identified Health Risks:     The patient reports that she has difficulty with instrumental activities of daily living.  She was provided with a list of local organizations that provide support  services and advised to make a follow up appointment in 12 weeks to address this further.   She is at risk for falling and has been provided with information to reduce the risk of falling at home.  Information regarding advance directives (living levine), including where she can download the appropriate form, was provided to the patient via the AVS.       The patient was provided with appropriate referrals to address her memory problem.

## 2021-06-23 NOTE — TELEPHONE ENCOUNTER
A user error has taken place: encounter opened in error, closed for administrative reasons. See message 1/31/19

## 2021-06-23 NOTE — PROGRESS NOTES
Assessment/Plan:        1. Age-related osteoporosis without current pathological fracture  Ambulatory referral to Medication Management       Return in about 5 weeks (around 3/7/2019) for Recheck.    Patient Instructions   We will start Forteo when approved by your insurance.    When start Forteo, DO NOT TAKE calcium supplements. Only what you get from the food.    You should schedule visit with Pharmacist now and 1 month after Forteo started to recheck calcium level in you blood.      Chief Complaint   Patient presents with     DEXA scan results     DXA done on 1/21/19:  1. The spine bone density L1-L3 with T-score -5.4 and significant decline of 8.4 % compared to 2012.  2. Femoral bone densities show left total hip T- score -3.3 and right femoral neck T-score -3.4, stable.  3. Trabecular bone score indicates poor trabecular bone architecture.    She has history of wrist and elbow fracture, just falling from the standing height. Her mother had osteoporosis, but no fractures.    Lab results:  Component      Latest Ref Rng & Units 1/11/2019   Vitamin D, Total (25-Hydroxy)      30.0 - 80.0 ng/mL 22.0 (L)   TSH      0.30 - 5.00 uIU/mL 2.40     Component      Latest Ref Rng & Units 1/11/2019   Sodium      136 - 145 mmol/L 140   Potassium      3.5 - 5.0 mmol/L 4.5   Chloride      98 - 107 mmol/L 104   CO2      22 - 31 mmol/L 24   Anion Gap, Calculation      5 - 18 mmol/L 12   Glucose      70 - 125 mg/dL 84   BUN      8 - 28 mg/dL 15   Creatinine      0.60 - 1.10 mg/dL 0.82   GFR MDRD Af Amer      >60 mL/min/1.73m2 >60   GFR MDRD Non Af Amer      >60 mL/min/1.73m2 >60   Bilirubin, Total      0.0 - 1.0 mg/dL 0.5   Calcium      8.5 - 10.5 mg/dL 9.6   Protein, Total      6.0 - 8.0 g/dL 8.0   ALBUMIN      3.5 - 5.0 g/dL 3.9   Alkaline Phosphatase      45 - 120 U/L 126 (H)   AST      0 - 40 U/L 21   ALT      0 - 45 U/L 14   WBC      4.0 - 11.0 thou/uL 7.5   RBC      3.80 - 5.40 mill/uL 4.95   Hemoglobin      12.0 - 16.0 g/dL  14.6   Hematocrit      35.0 - 47.0 % 44.5   MCV      80 - 100 fL 90   MCH      27.0 - 34.0 pg 29.5   MCHC      32.0 - 36.0 g/dL 32.8   RDW      11.0 - 14.5 % 12.2   Platelets      140 - 440 thou/uL 367   MPV      7.0 - 10.0 fL 6.7 (L)     She is taking ergocalciferol and vit D3 now. Alkaline phosphatase is mildly elevated. She will schedule bone scan.  We discussed all possible treatment options. Oral bisphosphonate will be contraindicated considering significant problems with GERD ( now better with Omeprazole). We discussed use of Prolia. Use of anabolic agent will the most beneficial in her case. She never had radiation or kidney stones. Calcium level is normal and no history of hyperparathyroidism.  All printed information provided today. I will see her in the month, when Forteo approved. She will meet with MTM too.    /68 (Patient Site: Right Arm, Patient Position: Sitting, Cuff Size: Adult Regular)   Pulse 60   Wt 103 lb 14.4 oz (47.1 kg)   BMI 20.81 kg/m    25 minutes spent with the patient and more then 50 % of the time in counseling.  This note has been dictated using voice recognition software. Any grammatical or context distortions are unintentional and inherent to the software      Patient Active Problem List   Diagnosis     Chronic Reflux Esophagitis     Malabsorption Syndrome     Osteoporosis     Adnexal mass       Current Outpatient Medications on File Prior to Visit   Medication Sig Dispense Refill     b complex vitamins tablet Take 1 tablet by mouth daily.       CARBOXYMETHYL-GLY-[id number] OPHT Administer 1 drop to both eyes 4 (four) times a day.       diclofenac sodium (VOLTAREN) 1 % Gel Apply topically as needed.       ergocalciferol (ERGOCALCIFEROL) 50,000 unit capsule Take 1 capsule (50,000 Units total) by mouth once a week. 20 capsule 0     omeprazole (PRILOSEC) 20 MG capsule Take 1 capsule (20 mg total) by mouth daily. 30 capsule 1     calcium carbonate-vitamin D3 500 mg(1,250mg) -125  unit per tablet Take 1 capsule by mouth daily.       UNABLE TO FIND Med Name: Rhydfen Gel       No current facility-administered medications on file prior to visit.

## 2021-06-23 NOTE — TELEPHONE ENCOUNTER
Additional questions received, answered and faxed back to [phone]   Expected Date Of Service: 06/27/2018 Bill For Surgical Tray: no Billing Type: Third-Party Bill

## 2021-06-23 NOTE — TELEPHONE ENCOUNTER
Patient Returning Call  Reason for call:  Daughter returning call to clinic  Information relayed to patient:  Yes as requested below  Patient has additional questions:  Yes  If YES, what are your questions/concerns:    Should the patient be on the medication Forteo prior to scheduling this bone scan? Daughter stated We are scheduled next Friday 2/15/19 with the MTM to be taught how to administer this and she will start this then.   Please advise   Okay to leave a detailed message?: Yes

## 2021-06-23 NOTE — TELEPHONE ENCOUNTER
Left message on both numbers for Tanna (daughter) to call the clinic. Please give the message below when she calls back.

## 2021-06-23 NOTE — PATIENT INSTRUCTIONS - HE
We will start Forteo when approved by your insurance.    When start Forteo, DO NOT TAKE calcium supplements. Only what you get from the food.    You should schedule visit with Pharmacist now and 1 month after Forteo started to recheck calcium level in you blood.

## 2021-06-23 NOTE — TELEPHONE ENCOUNTER
Left a detailed message on pt's Home number with response from Dr. Christensen and left the clinic number for call back if any further questions.

## 2021-06-23 NOTE — PROGRESS NOTES
Clinic Note    Assessment:     Assessment and Plan:  1. Elevated BP without diagnosis of hypertension  Blood pressure is 128/68 today.  She brings a log of blood pressures with her to her appointment today; most of these are well within normal range.  We are not going to start her on medication.  I am going to have her follow-up with me in 3 months for BP checkup.       Patient Instructions   I printed out your lab results for you to take home with you.    Everything looked okay.  Vitamin D was very low.  Dr. Christensen recommended that you take 2000 IUs vitamin D3 every day.  We can recheck your vitamin D levels when you come back to see me for a blood pressure check in 3 months.    Use your omeprazole for 4 weeks and then stop.  Omeprazole is not good for your osteoporosis.    Please call [phone] to schedule your mammogram and bone density scan.    In general, we want your blood pressure to be less than 140/90 consistently.  If you notice that your blood pressure is higher than this, come back to see me sooner.    Return in about 3 months (around 4/16/2019).         Subjective:      Patient comes to clinic today for follow-up of her blood pressures.    Patient was initially seen by her PCP 2 weeks ago for a welcome to Medicare visit.  At that time, her blood pressure was found to be 154/72.  It was recommended that she come back for a blood pressure check.    She brings a log of blood pressures with her to her appointment today.  Most of these measurements are between 130 and 135 systolic.  Most diastolic readings are less than 90.    In general, the patient has no acute complaints today.  She is feeling quite well.  No chest pain or shortness of breath.  No headache or dizziness.  No swelling in her lower extremities.    The following portions of the patient's history were reviewed and updated as appropriate: Allergies, medications, problems, prior note.    Review of Systems:    Review is otherwise negative  except for what is mentioned above.     Social Hx:    Social History     Tobacco Use   Smoking Status Never Smoker   Smokeless Tobacco Never Used         Objective:     Vitals:    01/16/19 0812   BP: 128/68   Pulse: 66   Weight: 104 lb 4.8 oz (47.3 kg)       Exam:    General: No apparent distress. Calm. Alert and Oriented X3. Pt behavior is appropriate.  Chest/Lungs: Normal chest wall, clear to auscultation, normal respiratory effort and rate.   Heart/Pulses: Regular rate and rhythm, strong and equal radial pulses, no murmurs. Capillary refill <2 seconds. No edema.       Patient Active Problem List   Diagnosis     Chronic Reflux Esophagitis     Malabsorption Syndrome     Osteoporosis     Adnexal mass     Current Outpatient Medications   Medication Sig Dispense Refill     omeprazole (PRILOSEC) 20 MG capsule Take 1 capsule (20 mg total) by mouth daily. 30 capsule 1     b complex vitamins tablet Take 1 tablet by mouth daily.       calcium carbonate-vitamin D3 500 mg(1,250mg) -125 unit per tablet Take 1 capsule by mouth daily.       CARBOXYMETHYL-GLY-[id number]-[id number] OPHT Administer 1 drop to both eyes 4 (four) times a day.       diclofenac sodium (VOLTAREN) 1 % Gel Apply topically as needed.       ergocalciferol (ERGOCALCIFEROL) 50,000 unit capsule Take 1 capsule (50,000 Units total) by mouth once a week. 20 capsule 0     UNABLE TO FIND Med Name: Rhydfen Gel       No current facility-administered medications for this visit.        I spent 20 minutes with patient face to face, of which >50% was counseling regarding the above plan       Ward Cloud CNP (Rob)    1/16/2019

## 2021-06-23 NOTE — TELEPHONE ENCOUNTER
She should start 2000 IU daily now while taking 50.000 IU once a week. She should continue 2000 IU daily for the rest of her life, even when done with 50.000 IU weekly.

## 2021-06-23 NOTE — PATIENT INSTRUCTIONS - HE
I printed out your lab results for you to take home with you.    Everything looked okay.  Vitamin D was very low.  Dr. Christensen recommended that you take 2000 IUs vitamin D3 every day.  We can recheck your vitamin D levels when you come back to see me for a blood pressure check in 3 months.    Use your omeprazole for 4 weeks and then stop.  Omeprazole is not good for your osteoporosis.    Please call [phone] to schedule your mammogram and bone density scan.    In general, we want your blood pressure to be less than 140/90 consistently.  If you notice that your blood pressure is higher than this, come back to see me sooner.

## 2021-06-23 NOTE — TELEPHONE ENCOUNTER
Left message at both listed numbers for Tanna (daughter) to call the clinic. Please give the message below when she calls back.    Shawnee Burgess CMA ...... 5:17 PM, 2/1/2019

## 2021-06-23 NOTE — TELEPHONE ENCOUNTER
[facility] for daughter Tanna (consent is on file)    Per Dr. Christensen - the patient's lab test for alkaline phosphatase is elevated and she should schedule a bone scan (order is in the chart) The number to schedule is [phone].

## 2021-06-23 NOTE — TELEPHONE ENCOUNTER
----- Message from Joellen Christensen MD sent at 1/15/2019 12:09 PM CST -----  Call the pt and mail results.  Very low vit D. Rx for ergocalciferol is sent and pt also needs to take 2000 IU of vit D3 OTC daily.

## 2021-06-23 NOTE — TELEPHONE ENCOUNTER
Patient Returning Call  Reason for call:  Patients daughter is calling back for the patient  Information relayed to patient:   Very low vit D. Rx for ergocalciferol is sent and pt also needs to take 2000 IU of vit D3 OTC daily  Patient has additional questions:  Yes  If YES, what are your questions/concerns:  Daughter is wondering if she is to start the 2000 units while on the 50,000 units or to wait till done with the 50,000 units  Please advise  Okay to leave a detailed message?: Yes [phone]

## 2021-06-24 NOTE — TELEPHONE ENCOUNTER
LMTCB    Please advise of below when patient returns call.    Shawnee Burgess CMA ...... 2:28 PM, 2/22/2019

## 2021-06-24 NOTE — PATIENT INSTRUCTIONS - HE
Recommendations from today's Medication Management (MTM) visit:                                                      1. Start Forteo 20 mcg injected once daily.     2. Recheck calcium level in 1 month after starting Forteo.      To schedule another MTM appointment, please call the clinic directly or you may call   the MTM scheduling line at [phone] or toll-free at [phone].     My MTM pharmacist's contact information:                                                      It was a pleasure seeing you today. Thank you for your engagement in getting the most out of your medications! Please feel free to contact me with any questions or concerns you have.      Paula Melendez, PharmD, BCACP  Medication Management (MTM) Pharmacist  [facility]  [phone]

## 2021-06-24 NOTE — PROGRESS NOTES
"MTM Consult Encounter    Helen Graham is a 79 y.o. female referred for a clinical pharmacist consult from Dr. Christensen for Forteo education.     Discussion:   Patient was referred for Forteo education. PA has been approved. She has diagnosis of osteoporosis with history of fracture. Oral bisphosphonate contraindicated due to significant problems with GERD, now on omeprazole.   Medication education and counseling was provided, including indication, expected effect, dosing instructions, and possible side effects. The patient's questions were answered. She demonstrated understanding of injection instructions via use of demo Forteo pen.     DXA done on 1/21/19:  1. The spine bone density L1-L3 with T-score -5.4 and significant decline of 8.4 % compared to 2012.  2. Femoral bone densities show left total hip T- score -3.3 and right femoral neck T-score -3.4, stable.  3. Trabecular bone score indicates poor trabecular bone architecture.     Plan:  1. Start Forteo 20 mcg SQ daily.   2. Stop calcium supplement.     Follow up:   1 month for calcium check      Paula Melendez, PharmD, BCACP  Medication Management Pharmacist  [facility]       Current Outpatient Medications   Medication Sig Dispense Refill     b complex vitamins tablet Take 1 tablet by mouth daily.       calcium carbonate-vitamin D3 500 mg(1,250mg) -125 unit per tablet Take 1 capsule by mouth daily.       CARBOXYMETHYL-GLY-IEAX80-KP OPHT Administer 1 drop to both eyes 4 (four) times a day.       diclofenac sodium (VOLTAREN) 1 % Gel Apply topically as needed.       ergocalciferol (ERGOCALCIFEROL) 50,000 unit capsule Take 1 capsule (50,000 Units total) by mouth once a week. 20 capsule 0     omeprazole (PRILOSEC) 20 MG capsule Take 1 capsule (20 mg total) by mouth daily. 30 capsule 1     pen needle, diabetic (SURE-FINE PEN NEEDLES) 31 gauge x 3/16\" Ndle Use daily with Forteo 100 each 3     teriparatide (FORTEO) 20 mcg/dose - 600 " mcg/2.4 mL injection Inject 0.08 mL (20 mcg total) under the skin daily. 2.4 mL 11     UNABLE TO FIND Med Name: Rhydfen Gel       No current facility-administered medications for this visit.

## 2021-06-24 NOTE — TELEPHONE ENCOUNTER
----- Message from Martha Burgess MD sent at 2/21/2019  3:59 PM CST -----  Please let her know that her bone scan was fine; Dr. KO will follow up with later as well.

## 2021-06-25 NOTE — PROGRESS NOTES
Assessment/Plan:        1. Age-related osteoporosis without current pathological fracture  Calcium, Ionized, Measured       Return in about 1 year (around 3/20/2020) for Recheck.    Patient Instructions   If you have any problems with Forteo injection, come to see me in 1-2 months. If you are doing fine, I will see you in 1 year.    DXA in January 2020.      Chief Complaint   Patient presents with     Osteoporosis Follow Up     Follow up Forteo.        /80   Pulse 62   Wt 103 lb 14.4 oz (47.1 kg)   BMI 20.81 kg/m      Patient is here today for follow up of osteoporosis treatment with Forteo No reported significant side effects, except occasional difficulty concentrating when doing Sudoku and some occasional strange feeling in the front or back of the head, but not pain or dizziness.. Patient will continue with the same supplements.I will  recheck  calcium level. Patient will continue Forteo daily and follow up with me in 1 year with repeated DXA scan.    15 minutes spent with the patient and more then 50 % of the time in counseling.  This note has been dictated using voice recognition software. Any grammatical or context distortions are unintentional and inherent to the software      Patient Active Problem List   Diagnosis     Chronic Reflux Esophagitis     Malabsorption Syndrome     Osteoporosis     Adnexal mass       Current Outpatient Medications on File Prior to Visit   Medication Sig Dispense Refill     b complex vitamins tablet Take 1 tablet by mouth daily.       CARBOXYMETHYL-GLY-DLYI55-GI OPHT Administer 1 drop to both eyes 4 (four) times a day.       diclofenac sodium (VOLTAREN) 1 % Gel Apply topically as needed.       ergocalciferol (ERGOCALCIFEROL) 50,000 unit capsule Take 1 capsule (50,000 Units total) by mouth once a week. 20 capsule 0     omeprazole (PRILOSEC) 20 MG capsule Take 1 capsule (20 mg total) by mouth daily. 30 capsule 1     pen needle, diabetic (SURE-FINE PEN NEEDLES) 31 gauge x  "3/16\" Ndle Use daily with Forteo 100 each 3     teriparatide (FORTEO) 20 mcg/dose - 600 mcg/2.4 mL injection Inject 0.08 mL (20 mcg total) under the skin daily. 2.4 mL 11     UNABLE TO FIND Med Name: Rhydfen Gel       No current facility-administered medications on file prior to visit.      "

## 2021-06-25 NOTE — TELEPHONE ENCOUNTER
Patient Returning Call  Reason for call:  Return call  Information relayed to patient:  Patient's daughter was informed of patient's results below about patient's calcium lab.  Patient has additional questions:  No  If YES, what are your questions/concerns:  n/a  Okay to leave a detailed message?: No call back needed

## 2021-06-25 NOTE — PATIENT INSTRUCTIONS - HE
If you have any problems with Forteo injection, come to see me in 1-2 months. If you are doing fine, I will see you in 1 year.    DXA in January 2020.

## 2021-06-25 NOTE — TELEPHONE ENCOUNTER
----- Message from Joellen Christensen MD sent at 3/20/2019  7:52 PM CDT -----  Calcium is good. She can continue Forteo and vit D as we discussed. She does not need calcium supplements.

## 2021-06-26 ENCOUNTER — HEALTH MAINTENANCE LETTER (OUTPATIENT)
Age: 81
End: 2021-06-26

## 2021-07-03 NOTE — ADDENDUM NOTE
Addendum Note by Filemon Christensen MD at 1/3/2020  3:00 PM     Author: Filemon Christensen MD Service: -- Author Type: Physician    Filed: 1/4/2020  7:56 PM Encounter Date: 1/3/2020 Status: Signed    : Filemon Christensen MD (Physician)    Addended by: FILEMON CHRISTENSEN on: 1/4/2020 07:56 PM        Modules accepted: Orders

## 2021-07-03 NOTE — ADDENDUM NOTE
Addendum Note by Shilpa Cooper RT (R) at 1/16/2019  8:00 AM     Author: Shilpa Cooper RT (R) Service: -- Author Type: Radiologic Technologist    Filed: 1/16/2019  9:27 AM Encounter Date: 1/16/2019 Status: Signed    : Shilpa Cooper RT (R) (Radiologic Technologist)    Addended by: SHILPA COOPER on: 1/16/2019 09:27 AM        Modules accepted: Orders

## 2021-07-06 ENCOUNTER — TRANSCRIBE ORDERS (OUTPATIENT)
Dept: INTERNAL MEDICINE | Facility: CLINIC | Age: 81
End: 2021-07-06

## 2021-07-06 DIAGNOSIS — M81.0 AGE-RELATED OSTEOPOROSIS WITHOUT CURRENT PATHOLOGICAL FRACTURE: ICD-10-CM

## 2021-07-06 DIAGNOSIS — Z92.29 PERSONAL HISTORY OF OTHER DRUG THERAPY: Primary | ICD-10-CM

## 2021-07-06 NOTE — PROGRESS NOTES
As part of the required manual data conversion process for integration, this encounter was created to document a CAM (Clinic Administered Medication) order. This information was copied from the [facility] patient's chart to the [facility] patient chart.     Paula Melendez, Imani  July 6, 2021

## 2021-07-19 ENCOUNTER — ALLIED HEALTH/NURSE VISIT (OUTPATIENT)
Dept: FAMILY MEDICINE | Facility: CLINIC | Age: 81
End: 2021-07-19
Payer: COMMERCIAL

## 2021-07-19 DIAGNOSIS — M81.0 OSTEOPOROSIS: Primary | ICD-10-CM

## 2021-07-19 PROCEDURE — 99207 PR NO CHARGE NURSE ONLY: CPT

## 2021-07-19 PROCEDURE — 96372 THER/PROPH/DIAG INJ SC/IM: CPT | Performed by: PHARMACIST

## 2021-07-19 NOTE — PROGRESS NOTES
"Prolia Injection Phone Screen      Screening questions have been asked 2-3 days prior to administration visit for Prolia. If any questions are answered with \"Yes,\" this phone encounter were will routed to ordering provider for further evaluation.     1.  When was the last injection?  First injection    2.  Has insurance for this injection been verified?  Yes    3.  Did you experience any new onset achiness or rashes that lasted for over a month with your previous Prolia injection?   N/A    4.  Do you have a fever over 101?F or a new deep cough that is unusual for you today? No    5.  Have you started any new medications in the last 6 months that you were told could affect your immune system? These may have been prescribed by oncologist, transplant, rheumatology, or dermatology.   No    6.  In the last 6 months have you have gastric bypass or parathyroid surgery?   No    7.  Do you plan dental work requiring drilling into the bone such as implants/extractions or oral surgery in the next 2-3 months?   No    8. Do you have new insurance since the last injection?    9. Have you received the Covid-19 vaccine? Yes  If No - Proceed with Prolia injection  If Yes - Date of vaccination 3/9/2021, 2/16/2021. Will need to wait until 2 weeks after 2nd dose of Covid-19 vaccine before administering Prolia       Patient informed if symptoms discussed above present prior to their administration appointment, they are to notify clinic immediately.     Meli Narayanan            "

## 2021-08-11 ENCOUNTER — PATIENT OUTREACH (OUTPATIENT)
Dept: GERIATRIC MEDICINE | Facility: CLINIC | Age: 81
End: 2021-08-11

## 2021-08-12 NOTE — PROGRESS NOTES
[facility] Care Coordination Contact    Emailed adult daughter Tanna to schedule annual HRA home visit. Emailed message asking for a return call or email back.      ADELAIDE Maguire  [facility]  Phone: [phone]     Pt returned call and Dr. Cody's message below reviewed with pt. Pt states that she is just not certain if she wants to try the Humira or the Methotrexate at this point as both have complications or side effects that she is concerned about. Pt states that she is on the Depo shot for BC but is still concerned as that is not 100% . Pt states that she is going to try the Triamcinolone cream and will call back if she would like to pursue other options. Will route this to Dr. Cody so he is aware..Jackie Wan RN

## 2021-08-18 ENCOUNTER — PATIENT OUTREACH (OUTPATIENT)
Dept: GERIATRIC MEDICINE | Facility: CLINIC | Age: 81
End: 2021-08-18

## 2021-08-19 NOTE — PROGRESS NOTES
[facility] Care Coordination Contact    Called adult daughter Tanna to schedule annual HRA home visit. HRA has been scheduled for 08/20/21 2 PM.     ADEALIDE Maguire  [facility]  Phone: [phone]

## 2021-08-20 ENCOUNTER — PATIENT OUTREACH (OUTPATIENT)
Dept: GERIATRIC MEDICINE | Facility: CLINIC | Age: 81
End: 2021-08-20

## 2021-09-03 ASSESSMENT — ACTIVITIES OF DAILY LIVING (ADL): DEPENDENT_IADLS:: INDEPENDENT

## 2021-09-03 NOTE — PROGRESS NOTES
[facility] Care Coordination Contact    [facility] Home Visit Assessment     Home visit for Health Risk Assessment with Helen Graham completed on 08/20/2021    Type of residence:: Private home - stairs  Current living arrangement:: I live in a private home with family     Assessment completed with:: Patient, Children, Family, Spouse or significant other    Current Care Plan  Member currently receiving the following home care services:   None  Member currently receiving the following community resources: None      Medication Review  Medication reconciliation completed in Epic: Yes  Medication set-up & administration: Family/informal caregiver sets up daily.  Self-administers medications.  Medication Risk Assessment Medication (1 or more, place referral to MTM): N/A: No risk factors identified  MTM Referral Placed: No: No risk factors idenified    Mental/Behavioral Health   Depression Screening:   PHQ-2 Total Score (Adult) - Positive if 3 or more points; Administer PHQ-9 if positive: 0       Mental health DX:: No        Falls Assessment:   Fallen 2 or more times in the past year?: No   Any fall with injury in the past year?: No    ADL/IADL Dependencies:   Dependent ADLs:: Independent  Dependent IADLs:: Independent    [facility] Health Plan sponsored benefits: Shared information re: Silver Sneakers/gym memberships, ASA, Calcium +D.    PCA Assessment completed at visit: Not Applicable     Elderly Waiver Eligibility: No-does not meet criteria    Care Plan & Recommendations: Helen lives with her spouse Nikki and their Daughter Tanna and her family.    Helen cares for her spouse Nikki.   Helen practices yoga daily.   Helen is well and stable and has no needs at this time.       See CC for detailed assessment information.    Follow-Up Plan: Member informed of future contact, plan to f/u with member with a 6 month telephone assessment.  Contact information shared with member and family, encouraged  member to call with any questions or concerns at any time.    [place] care continuum providers: Please refer to Health Care Home on the Epic Problem List to view this patient's [facility] Care Plan Summary.    ADELAIDE Maguire  [facility]  Phone: [phone]

## 2021-09-17 ENCOUNTER — PATIENT OUTREACH (OUTPATIENT)
Dept: GERIATRIC MEDICINE | Facility: CLINIC | Age: 81
End: 2021-09-17

## 2021-09-17 NOTE — PROGRESS NOTES
[facility] Care Coordination Contact    Received after visit chart from care coordinator.  Completed following tasks: Mailed copy of care plan to client.    Kim Julio  Care Management Specialist  [facility]  [phone]

## 2021-09-17 NOTE — LETTER
September 17, 2021    Important [employer] Information    DARREL GRACIATERJEE  [address] [address]  Your Care Plan  Dear Darrel,  When we spoke recently, I promised to send you a Care Plan. The plan enclosed is a summary of our discussion. It includes the steps we agreed would help you meet your health goals. In addition, I can help you with:  Vqupwgx-R-FezjTL  This program is available to members who need a ride to medical and dental visits. To schedule a ride, call [phone] or [phone] (toll free). TTY: 711. You can call 8 a.m. to 8 p.m. Seven days a week. Access to a representative may be limited at times.    [employer]   The [employer] program empowers you to improve your health through education and exercise. To learn more, visit [employer], or call [employer] at [phone] (toll free) (TTY:711) from 7 a.m. - 7 p.m. Central Time, Monday-Friday.  Health Care Directive   This form helps you outline your health care wishes. You can request a form from me and I will answer any questions you have before you discuss it with your doctor.   Annual Physical  Take a key step on your path to good health and set up an annual physical at your clinic.  Questions?  Call me at   Monday-Friday between 8am and 5pm.  TTY: 711. As we discussed, I plan to be in touch with you again in 6 months to follow up via phone.  Sincerely,    ADELAIDE Maguire  [phone]  [email or web address]    cc: member records                                                                                             CB5 ([facility]) (5-2020)    Civil Rights Notice  Discrimination is against the law. [employer] does not discriminate on the basis of any of the following:    Race    Color    National Origin    Creed    Druze    Age    Public Assistance Status    Receipt of Health Care Services    Disability (including physical or mental impairment)    Sex (including sex stereotypes and gender  identity)    Marital Status    Political Beliefs    Medical Condition    Genetic Information    Sexual Orientation    Claims Experience    Medical History    Health Status    Auxiliary Aids and Services:  [employer] provides auxiliary aids and services, like qualified interpreters or information in accessible formats, free of charge and in a timely manner, to ensure an equal opportunity to participate in our health care programs. Contact [employer] at [place]/contact medicaid or call [phone] (toll free); TTY:71 or at [email or web address].    Language Assistance Services:  [employer] provides translated documents and spoken language interpreting, free of charge and in a timely manner, when language assistance services are necessary to ensure limited English speakers have meaningful access to our information and services. Contact [employer] at [phone] (toll free); TTY: 200 or [email or web address].     Civil Rights Complaints  You have the right to file a discrimination complaint if you believe you were treated in a discriminatory way by [employer]. You may contact any of the following four agencies directly to file a discrimination complaint.    U.S. Department of Health and Human Services  Office for Civil Rights (OCR)  You have the right to file a complaint with the OCR, a federal agency, if you believe you have been discriminated against because of any of the following:    Race    Disability    Color    Sex    National Origin    Age    Christian (in some cases)    Contact the OCR directly to file a complaint:         Director         U.S. Department of Health and Human Services  Office for Civil Rights         [address] [place] [address], [place] [address]         Customer Response Center: Toll-free: [phone]          TDD: [phone]         Email: ocrmail@[email or web address].gov    [place] Department of Human Rights (MD)  In [place], you have the right to file a  complaint with the MDHR if you believe you have been discriminated against because of any of the following:      Race    Color    National Origin    Quaker    Creed    Sex    Sexual Orientation    Marital Status    Public Assistance Status    Disability    Contact the MDHR directly to file a complaint:         [facility] Department of Human Rights         [address] [address]         [phone] (voice)          [phone] (toll free)         711 or [phone] ([place] Relay)         [phone] (Fax)         [email or web address] (Email)     [place] Department of Human Services (DHS)  You have the right to file a complaint with [facility] if you believe you have been discriminated against in our health care programs because of any of the following:    Race    Color    National Origin    Creed    Quaker    Age    Public Assistance Status    Receipt of Health Care Services    Disability (including physical or mental impairment)    Sex (including sex stereotypes and gender identity)    Marital Status    Political Beliefs    Medical Condition    Genetic Information    Sexual Orientation    Claims Experience    Medical History    Health Status    Complaints must be in writing and filed within 180 days of the date you discovered the alleged discrimination. The complaint must contain your name and address and describe the discrimination you are complaining about. After we get your complaint, we will review it and notify you in writing about whether we have authority to investigate. If we do, we will investigate the complaint.      [facility] will notify you in writing of the investigation s outcome. You have a right to appeal the outcome if you disagree with the decision. To appeal, you must send a written request to have [facility] review the investigation outcome. Be brief and state why you disagree with the decision. Include additional information you think is important.      If you  file a complaint in this way, the people who work for the agency named in the complaint cannot retaliate against you. This means they cannot punish you in any way for filing a complaint. Filing a complaint in this way does not stop you from seeking out other legal or administration actions.     Contact DHS directly to file a discrimination complaint:        Civil Rights Coordinator        [place] Department of Human Services        Equal Opportunity and Access Division        [address] [address]        [place] [address] [phone] (voice) or use your preferred relay service     [employer] Complaint Notice   You have the right to file a complaint with [employer] if you believe you have been discriminated against because of any of the following:       Medical condition    Health status    Receipt of health care services    Claims experience    Medical history    Genetic information    Disability (including mental or physical impairment)    Marital status    Age    Sex (including sex stereotypes and gender identity)    Sexual orientation    National origin    Race    Color    Buddhism    Creed    Public assistance status    Political beliefs    You can file a complaint and ask for help in filing a complaint in person or by mail, phone, fax, or email at:     [employer] Civil Rights Coordinator  [employer] [facility]  [address], [place] [address] [phone] (voice and fax) or TTY:267  Email: [email or web address]    American Indians can begin or continue to use [place] and  Health Services (IHS) clinics. We will not require prior approval or impose any conditions for you to get services at these clinics. For elders age 65 years and older this includes Elderly Waiver (EW) services accessed through the [place]. If a doctor or other provider in a [place] or IHS clinic refers you to a provider in our network, we will not require you to see your primary care provider prior to the  referral.

## 2021-10-07 ENCOUNTER — OFFICE VISIT (OUTPATIENT)
Dept: INTERNAL MEDICINE | Facility: CLINIC | Age: 81
End: 2021-10-07
Payer: COMMERCIAL

## 2021-10-07 VITALS
SYSTOLIC BLOOD PRESSURE: 133 MMHG | BODY MASS INDEX: 20.77 KG/M2 | DIASTOLIC BLOOD PRESSURE: 72 MMHG | HEART RATE: 62 BPM | OXYGEN SATURATION: 96 % | WEIGHT: 104.6 LBS

## 2021-10-07 DIAGNOSIS — M81.0 AGE-RELATED OSTEOPOROSIS WITHOUT CURRENT PATHOLOGICAL FRACTURE: Primary | ICD-10-CM

## 2021-10-07 PROCEDURE — 99213 OFFICE O/P EST LOW 20 MIN: CPT | Performed by: INTERNAL MEDICINE

## 2021-10-07 NOTE — PROGRESS NOTES
(M81.0) Age-related osteoporosis without current pathological fracture  (primary encounter diagnosis)      Patient was educated on safety of Prolia utilizing Patient Counseling Chart for Healthcare Providers, as outlined by the Prolia REMS progam.     Return in about 3 months (around 1/7/2022) for Follow up, AWV, osteoporosis.    Patient Instructions   Prolia # 1 was on 7/19/21.  Prolia # 2 should be around 1/19/22, but can be even few weeks later, when you finish tooth extractions.  The best time to do the tooth extraction will be in between 2 doses. So, end of December or beginning of January.    Physical can be either with Prolia Jan/Feb or Prolia July/AIgust.                /72   Pulse 62   Wt 47.4 kg (104 lb 9.6 oz)   SpO2 96%   BMI 20.77 kg/m        Did you experience any problems with previous Prolia injection? no  Any medication change in the last 6 months? no  Did you take prednisone or other immunosupressant drugs in the last 6 months   (chemo, transplant, rheum, dermatology conditions)? no  Did you have any serious infection in the last 6 months?no  Any recent hospitalizations?no  Do you plan any dental work in the next 2-3 months?no  How much calcium do you take daily from the diet and supplements?1200 mg  How much vit D do you take daily? 2000 IU  Last DXA? 1/2020, Reviewed and discussed      Patient is here today for the Prolia injection. Patient tolerated previous injection well and her first injection was on 7/19/21.  We discussed calcium and vit D daily needs today.   She needs tooth extractions planned and the dentist was concerned about Prolia.          Next Prolia injection will be in 6 months.           This note has been dictated using voice recognition software. Any grammatical or context distortions are unintentional and inherent to the software      Patient Active Problem List   Diagnosis     Chronic Reflux Esophagitis     Malabsorption Syndrome     Osteoporosis       Current  Outpatient Medications   Medication     calcium cit-mag-D3-Zn--eva 713--3.75 mg-mg-unit-mg Tab     cholecalciferol, vitamin D3, (VITAMIN D3) 2,000 unit Tab     omeprazole (PRILOSEC) 20 MG capsule     Current Facility-Administered Medications   Medication     denosumab (PROLIA) injection 60 mg

## 2021-10-07 NOTE — PATIENT INSTRUCTIONS
Prolia # 1 was on 7/19/21.  Prolia # 2 should be around 1/19/22, but can be even few weeks later, when you finish tooth extractions.  The best time to do the tooth extraction will be in between 2 doses. So, end of December or beginning of January.    Physical can be either with Prolia Jan/Feb or Prolia July/AIgust.

## 2021-10-08 ENCOUNTER — ANCILLARY PROCEDURE (OUTPATIENT)
Dept: MAMMOGRAPHY | Facility: CLINIC | Age: 81
End: 2021-10-08
Attending: INTERNAL MEDICINE
Payer: COMMERCIAL

## 2021-10-08 DIAGNOSIS — Z12.31 VISIT FOR SCREENING MAMMOGRAM: ICD-10-CM

## 2021-10-08 PROCEDURE — 77063 BREAST TOMOSYNTHESIS BI: CPT

## 2021-10-11 ENCOUNTER — HEALTH MAINTENANCE LETTER (OUTPATIENT)
Age: 81
End: 2021-10-11

## 2021-10-29 ENCOUNTER — IMMUNIZATION (OUTPATIENT)
Dept: NURSING | Facility: CLINIC | Age: 81
End: 2021-10-29
Payer: COMMERCIAL

## 2021-10-29 PROCEDURE — 0004A PR COVID VAC PFIZER DIL RECON 30 MCG/0.3 ML IM: CPT

## 2021-10-29 PROCEDURE — 91300 PR COVID VAC PFIZER DIL RECON 30 MCG/0.3 ML IM: CPT

## 2021-12-22 ENCOUNTER — IMMUNIZATION (OUTPATIENT)
Dept: FAMILY MEDICINE | Facility: CLINIC | Age: 81
End: 2021-12-22
Payer: COMMERCIAL

## 2021-12-22 PROCEDURE — G0008 ADMIN INFLUENZA VIRUS VAC: HCPCS

## 2021-12-22 PROCEDURE — 90662 IIV NO PRSV INCREASED AG IM: CPT

## 2022-02-04 DIAGNOSIS — Z92.29 PERSONAL HISTORY OF OTHER DRUG THERAPY: ICD-10-CM

## 2022-02-04 DIAGNOSIS — M81.0 AGE-RELATED OSTEOPOROSIS WITHOUT CURRENT PATHOLOGICAL FRACTURE: Primary | ICD-10-CM

## 2022-02-23 ENCOUNTER — PATIENT OUTREACH (OUTPATIENT)
Dept: GERIATRIC MEDICINE | Facility: CLINIC | Age: 82
End: 2022-02-23
Payer: COMMERCIAL

## 2022-02-23 NOTE — PROGRESS NOTES
[facility] Care Coordination Contact      [facility] Six-Month Telephone Assessment    6 month telephone assessment completed on 02/23/22.    ER visits: No  Hospitalizations: No  TCU stays: No  Significant health status changes: NA  Falls/Injuries: No  ADL/IADL changes: No  Changes in services: No    Caregiver Assessment follow up:  NA    Goals: See POC in chart for goal progress documentation.  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   This will give a break for Helen from caring for her spouse and to have more personal time.  Waiver also purchased a stair lift for Helen's spouse will ensure safety and prevent falls for both.      Will see member in 6 months for an annual health risk assessment.   Encouraged member to call CC with any questions or concerns in the meantime.     ADELAIDE Maguire  [facility]  Phone: [phone]

## 2022-03-27 ENCOUNTER — HEALTH MAINTENANCE LETTER (OUTPATIENT)
Age: 82
End: 2022-03-27

## 2022-06-09 ENCOUNTER — TELEPHONE (OUTPATIENT)
Dept: INTERNAL MEDICINE | Facility: CLINIC | Age: 82
End: 2022-06-09
Payer: COMMERCIAL

## 2022-06-15 ENCOUNTER — OFFICE VISIT (OUTPATIENT)
Dept: INTERNAL MEDICINE | Facility: CLINIC | Age: 82
End: 2022-06-15
Payer: COMMERCIAL

## 2022-06-15 VITALS
OXYGEN SATURATION: 98 % | BODY MASS INDEX: 20.62 KG/M2 | WEIGHT: 105 LBS | HEIGHT: 60 IN | DIASTOLIC BLOOD PRESSURE: 80 MMHG | HEART RATE: 70 BPM | SYSTOLIC BLOOD PRESSURE: 130 MMHG

## 2022-06-15 DIAGNOSIS — M81.0 AGE-RELATED OSTEOPOROSIS WITHOUT CURRENT PATHOLOGICAL FRACTURE: Primary | ICD-10-CM

## 2022-06-15 DIAGNOSIS — M65.30 TRIGGER FINGER, ACQUIRED: ICD-10-CM

## 2022-06-15 DIAGNOSIS — M54.41 ACUTE RIGHT-SIDED LOW BACK PAIN WITH RIGHT-SIDED SCIATICA: ICD-10-CM

## 2022-06-15 LAB
ANION GAP SERPL CALCULATED.3IONS-SCNC: 9 MMOL/L (ref 5–18)
BUN SERPL-MCNC: 18 MG/DL (ref 8–28)
CALCIUM SERPL-MCNC: 9.2 MG/DL (ref 8.5–10.5)
CALCIUM, IONIZED MEASURED: 1.16 MMOL/L (ref 1.11–1.3)
CHLORIDE BLD-SCNC: 106 MMOL/L (ref 98–107)
CO2 SERPL-SCNC: 26 MMOL/L (ref 22–31)
CREAT SERPL-MCNC: 0.79 MG/DL (ref 0.6–1.1)
GFR SERPL CREATININE-BSD FRML MDRD: 75 ML/MIN/1.73M2
GLUCOSE BLD-MCNC: 114 MG/DL (ref 70–125)
ION CA PH 7.4: 1.13 MMOL/L (ref 1.11–1.3)
PH: 7.36 (ref 7.35–7.45)
POTASSIUM BLD-SCNC: 4.3 MMOL/L (ref 3.5–5)
SODIUM SERPL-SCNC: 141 MMOL/L (ref 136–145)

## 2022-06-15 PROCEDURE — 36415 COLL VENOUS BLD VENIPUNCTURE: CPT | Performed by: INTERNAL MEDICINE

## 2022-06-15 PROCEDURE — 96372 THER/PROPH/DIAG INJ SC/IM: CPT | Performed by: INTERNAL MEDICINE

## 2022-06-15 PROCEDURE — 80048 BASIC METABOLIC PNL TOTAL CA: CPT | Performed by: INTERNAL MEDICINE

## 2022-06-15 PROCEDURE — 82306 VITAMIN D 25 HYDROXY: CPT | Performed by: INTERNAL MEDICINE

## 2022-06-15 PROCEDURE — 99214 OFFICE O/P EST MOD 30 MIN: CPT | Mod: 25 | Performed by: INTERNAL MEDICINE

## 2022-06-15 PROCEDURE — 82330 ASSAY OF CALCIUM: CPT | Performed by: INTERNAL MEDICINE

## 2022-06-15 NOTE — PROGRESS NOTES
"  (M81.0) Age-related osteoporosis without current pathological fracture  (primary encounter diagnosis)  Comment: she was on Forteo for 2 years. Prolia # 1 was on 7/19/21.  Prolia # 2 was suppose to be around 1/19/22, but was missed.  Prolia #2 today.  Plan: Ionized Calcium, Basic metabolic panel, DX         Hip/Pelvis/Spine, Vitamin D Deficiency            (M65.30) Trigger finger, acquired  Comment: left hand second finger on and off gets \"stucked\".   Plan: - Hand specialist at [facility]  -at home, Voltaren gel 2-3 x/day    (M54.41) Acute right-sided low back pain with right-sided sciatica  Comment: yesterday had pain radiation down the right leg to ankle level, with no weakness, tingling. Today pain is gone. Sounds like sciatica.  Plan: - Advil prn  - Voltaren gel 2-3x.day  - heat on low back    Patient was educated on safety of Prolia utilizing Patient Counseling Chart for Healthcare Providers, as outlined by the Prolia REMS progam.     Return in about 6 months (around 12/15/2022) for AWV, Prolia.    Patient Instructions   Trigger finger:  - Hand specialist at [facility]  -at home, Voltaren gel 2-3 x/day    Low back pain and sciatica:  - Advil prn  - Voltaren gel 2-3x.day  - heat on low back    Prolia 2nd today.  Prolia 3rd in 6 months with me and your AWV.    DXA due now.   Phone number to schedule [phone].    Daily calcium need is 1432-4031 mg a day from the diet and supplements.  Calcium citrate is easier to digest.  Vitamin D 2000 IU daily recommended.                 /80   Pulse 70   Ht 1.511 m (4' 11.5\")   Wt 47.6 kg (105 lb)   SpO2 98%   BMI 20.85 kg/m        Did you experience any problems with previous Prolia injection? no  Any medication change in the last 6 months? no  Did you take prednisone or other immunosupressant drugs in the last 6 months   (chemo, transplant, rheum, dermatology conditions)? no  Did you have any serious infection in the last 6 months?no  Any recent " hospitalizations?no  Do you plan any dental work in the next 2-3 months?no  How much calcium do you take daily from the diet and supplements?1200 mg  How much vit D do you take daily? 2000 IU  Last DXA? 2/2020, Reviewed and discussed      Patient is here today for the 2nd Prolia injection. Patient tolerated previous injections well.   We discussed calcium and vit D daily needs today.   I reviewed the lab results:  Component      Latest Ref Rng & Units 1/6/2021   Sodium      136 - 145 mmol/L 141   Potassium      3.5 - 5.0 mmol/L 4.7   Chloride      98 - 107 mmol/L 103   Carbon Dioxide      22 - 31 mmol/L 26   Anion Gap      5 - 18 mmol/L 12   Glucose      70 - 125 mg/dL 87   Urea Nitrogen      8 - 28 mg/dL 20   Creatinine      0.60 - 1.10 mg/dL 0.80   GFR Estimate If Black      >60 mL/min/1.73m2 >60   GFR Estimate      >60 mL/min/1.73m2 >60   Bilirubin Total      0.0 - 1.0 mg/dL 0.6   Calcium      8.5 - 10.5 mg/dL 9.4   Protein Total      6.0 - 8.0 g/dL 7.5   Albumin      3.5 - 5.0 g/dL 4.0   Alkaline Phosphatase      45 - 120 U/L 179 (H)   AST      0 - 40 U/L 20   ALT      0 - 45 U/L 13           We discussed high risk of rebound vertebral fractures when Prolia suddenly stopped.    Next Prolia injection will be in 6 months.           This note has been dictated using voice recognition software. Any grammatical or context distortions are unintentional and inherent to the software      Patient Active Problem List   Diagnosis     Chronic Reflux Esophagitis     Malabsorption Syndrome     Osteoporosis       Current Outpatient Medications   Medication     cholecalciferol, vitamin D3, (VITAMIN D3) 2,000 unit Tab     calcium cit-mag-D3-Zn--eva 862--3.75 mg-mg-unit-mg Tab     Current Facility-Administered Medications   Medication     denosumab (PROLIA) injection 60 mg     Answers for HPI/ROS submitted by the patient on 6/15/2022  What is the reason for your visit today? : Checkup  How many servings of fruits and  vegetables do you eat daily?: 4 or more  On average, how many sweetened beverages do you drink each day (Examples: soda, juice, sweet tea, etc.  Do NOT count diet or artificially sweetened beverages)?: 0  How many minutes a day do you exercise enough to make your heart beat faster?: 30 to 60  How many days a week do you exercise enough to make your heart beat faster?: 7  How many days per week do you miss taking your medication?: 0

## 2022-06-15 NOTE — PATIENT INSTRUCTIONS
Trigger finger:  - Hand specialist at [facility]  -at home, Voltaren gel 2-3 x/day    Low back pain and sciatica:  - Advil prn  - Voltaren gel 2-3x.day  - heat on low back    Prolia 2nd today.  Prolia 3rd in 6 months with me and your AWV.    DXA due now.   Phone number to schedule [phone].    Daily calcium need is 3592-8212 mg a day from the diet and supplements.  Calcium citrate is easier to digest.  Vitamin D 2000 IU daily recommended.

## 2022-06-16 LAB — DEPRECATED CALCIDIOL+CALCIFEROL SERPL-MC: 46 UG/L (ref 20–75)

## 2022-07-05 ENCOUNTER — IMMUNIZATION (OUTPATIENT)
Dept: NURSING | Facility: CLINIC | Age: 82
End: 2022-07-05
Payer: COMMERCIAL

## 2022-07-05 PROCEDURE — 91305 COVID-19,PF,PFIZER (12+ YRS): CPT

## 2022-07-05 PROCEDURE — 0054A COVID-19,PF,PFIZER (12+ YRS): CPT

## 2022-07-08 ENCOUNTER — PATIENT OUTREACH (OUTPATIENT)
Dept: GERIATRIC MEDICINE | Facility: CLINIC | Age: 82
End: 2022-07-08

## 2022-07-08 ENCOUNTER — MYC MEDICAL ADVICE (OUTPATIENT)
Dept: INTERNAL MEDICINE | Facility: CLINIC | Age: 82
End: 2022-07-08

## 2022-07-08 DIAGNOSIS — H90.6 MIXED CONDUCTIVE AND SENSORINEURAL HEARING LOSS OF BOTH EARS: Primary | ICD-10-CM

## 2022-07-08 NOTE — TELEPHONE ENCOUNTER
See MyChart from patient needing provider direction.    Please respond directly to patient if appropriate.    Serena Perez, CATERINA  [facility]  RN Triage Team

## 2022-07-12 ASSESSMENT — ACTIVITIES OF DAILY LIVING (ADL): DEPENDENT_IADLS:: INDEPENDENT

## 2022-07-13 ENCOUNTER — PATIENT OUTREACH (OUTPATIENT)
Dept: GERIATRIC MEDICINE | Facility: CLINIC | Age: 82
End: 2022-07-13

## 2022-07-13 DIAGNOSIS — H90.8 MIXED CONDUCTIVE AND SENSORINEURAL HEARING LOSS, UNSPECIFIED LATERALITY: Primary | ICD-10-CM

## 2022-07-13 NOTE — PROGRESS NOTES
[facility] Care Coordination Contact    [facility] Home Visit Assessment     Home visit for Health Risk Assessment with Helen Graham completed on 07/08/2022    Type of residence:: Private home - stairs  Current living arrangement:: I live in a private home with spouse, I live in a private home with family     Assessment completed with:: Patient, Children    Current Care Plan  Member currently receiving the following home care services:     Member currently receiving the following community resources: None    Phone assessment due to COVID-19    Medication Review  Medication reconciliation completed in Epic: If no, please explain No face to face due to COVID-19  Medication set-up & administration: Independent and sets up on own daily.  Self-administers medications.  Medication Risk Assessment Medication (1 or more, place referral to MTM): N/A: No risk factors identified  MTM Referral Placed: No: No risk factors idenified    Mental/Behavioral Health   Depression Screening:   PHQ-2 Total Score (Adult) - Positive if 3 or more points; Administer PHQ-9 if positive: 0       Mental health DX:: No        Falls Assessment:   Fallen 2 or more times in the past year?: No   Any fall with injury in the past year?: No    ADL/IADL Dependencies:   Dependent ADLs:: Independent  Dependent IADLs:: Independent    [facility] Plan sponsored benefits: Shared information re: Silver Sneakers/gym memberships, ASA, Calcium +D.    PCA Assessment completed at visit: Not Applicable     Elderly Waiver Eligibility: No-does not meet criteria    Care Plan & Recommendations:   Helen does not qualify for supportive services at home as she is quite independent.   Her family did ask about a referral to an Audiologist.   They prefer some where in [place].    I suggested they reach out to PCP for a referral through My Chart to [facility].      See LTCC for detailed assessment information.    Follow-Up Plan: Member informed of future  contact, plan to f/u with member with a 6 month telephone assessment.  Contact information shared with member and family, encouraged member to call with any questions or concerns at any time.    [facility] care continuum providers: Please see Snapshot and Care Management Flowsheets for Specific details of care plan.    This CC note routed to PCP.    ADELAIDE Maguire  [facility]  Phone: [phone]

## 2022-07-13 NOTE — PROGRESS NOTES
[facility] Care Coordination Contact    Received after visit chart from care coordinator.  Completed following tasks: Mailed copy of care plan to client, Mailed copy of POC signature sheet for member to sign and return in SASE  and Mailed MEDICA Safe Medication Disposal    and Provider Signature - No POC Shared:  Member indicates that they do not want their POC shared with any EW providers.     Jacquelyn Vieira  Care Management Specialist  [facility]  [phone]

## 2022-07-13 NOTE — LETTER
July 13, 2022    Important [employer] Information    DARREL BOWERS  [address] [address]  Your Care Plan  Dear Darrel,  When we spoke recently, I promised to send you a Care Plan. The plan enclosed is a summary of our discussion. It includes the steps we agreed would help you meet your health goals. In addition, I can help you with:  [id number]  This program is available to members who need a ride to medical and dental visits. To schedule a ride, call [phone] or [phone] (toll free). TTY: 711. You can call 8 a.m. to 8 p.m. Seven days a week. Access to a representative may be limited at times.   One Pass  One Pass is your no-cost, complete, fitness solution for your mind and body. To learn more visit Receptor/fitness or call One Pass, toll-free 1 [phone] (TTY: 713) 8 a.m. to 9 p.m. Monday-Friday.  Health Care Directive   This form helps you outline your health care wishes. You can request a form from me and I will answer any questions you have before you discuss it with your doctor.   Annual Physical  Take a key step on your path to good health and set up an annual physical at your clinic.  Questions?  Call me at [phone] Monday-Friday between 8am and 5pm.  TTY: 714. As we discussed, I plan to be in touch with you again in 6 months to follow up via phone.  Sincerely,    ADELAIDE Maguire  [phone]  [email or web address]    cc: member records                                                                                             CB5 (MCOs) (5-2020)    Civil Rights Notice  Discrimination is against the law. [employer] does not discriminate on the basis of any of the following:    Race    Color    National Origin    Creed    Yarsanism    Age    Public Assistance Status    Receipt of Health Care Services    Disability (including physical or mental impairment)    Sex (including sex stereotypes and gender identity)    Marital Status    Political Beliefs    Medical  Condition    Genetic Information    Sexual Orientation    Claims Experience    Medical History    Health Status    Auxiliary Aids and Services:  [employer] provides auxiliary aids and services, like qualified interpreters or information in accessible formats, free of charge and in a timely manner, to ensure an equal opportunity to participate in our health care programs. Contact [employer] at [employer]/contact medicaid or call [phone] (toll free); TTY:719 or at [employer]/contactIntergeneraciones Servicioscaid.    Language Assistance Services:  [employer] provides translated documents and spoken language interpreting, free of charge and in a timely manner, when language assistance services are necessary to ensure limited English speakers have meaningful access to our information and services. Contact [employer] at [phone] (toll free); TTY: 595 or [email or web address].     Civil Rights Complaints  You have the right to file a discrimination complaint if you believe you were treated in a discriminatory way by [facility]. You may contact any of the following four agencies directly to file a discrimination complaint.    U.S. Department of Health and Human Services  Office for Civil Rights (OCR)  You have the right to file a complaint with the OCR, a federal agency, if you believe you have been discriminated against because of any of the following:    Race    Disability    Color    Sex    National Origin    Age    Sabianist (in some cases)    Contact the OCR directly to file a complaint:         Director         U.S. Department of Health and Human Services  Office for Civil Rights         [address] [place] [address]         Customer Response Center: Toll-free: [phone]          TDD: [phone]         Email: [email or web address]    [place] Department of Human Rights (MDHR)  In [place], you have the right to file a complaint with the [facility] if you believe you have been  discriminated against because of any of the following:      Race    Color    National Origin    Jainism    Creed    Sex    Sexual Orientation    Marital Status    Public Assistance Status    Disability    Contact the MD directly to file a complaint:         [facility] of Human Rights         [address] [place] [address]         [phone] (voice)          [phone] (toll free)         711 or [phone] ([place] Relay)         [phone] (Fax)         [email or web address] (Email)     [place] Department of Human Services (DHS)  You have the right to file a complaint with [facility] if you believe you have been discriminated against in our health care programs because of any of the following:    Race    Color    National Origin    Creed    Jainism    Age    Public Assistance Status    Receipt of Health Care Services    Disability (including physical or mental impairment)    Sex (including sex stereotypes and gender identity)    Marital Status    Political Beliefs    Medical Condition    Genetic Information    Sexual Orientation    Claims Experience    Medical History    Health Status    Complaints must be in writing and filed within 180 days of the date you discovered the alleged discrimination. The complaint must contain your name and address and describe the discrimination you are complaining about. After we get your complaint, we will review it and notify you in writing about whether we have authority to investigate. If we do, we will investigate the complaint.      [facility] will notify you in writing of the investigation s outcome. You have a right to appeal the outcome if you disagree with the decision. To appeal, you must send a written request to have [facility] review the investigation outcome. Be brief and state why you disagree with the decision. Include additional information you think is important.      If you file a complaint in this way, the people who work for  the agency named in the complaint cannot retaliate against you. This means they cannot punish you in any way for filing a complaint. Filing a complaint in this way does not stop you from seeking out other legal or administration actions.     Contact DHS directly to file a discrimination complaint:        Civil Rights Coordinator        [place] Department of Human Services        Equal Opportunity and Access Division        [address] [address]        [place] [address] [phone] (voice) or use your preferred relay service     [employer] Complaint Notice   You have the right to file a complaint with [employer] if you believe you have been discriminated against because of any of the following:       Medical condition    Health status    Receipt of health care services    Claims experience    Medical history    Genetic information    Disability (including mental or physical impairment)    Marital status    Age    Sex (including sex stereotypes and gender identity)    Sexual orientation    National origin    Race    Color    Presybeterian    Creed    Public assistance status    Political beliefs    You can file a complaint and ask for help in filing a complaint in person or by mail, phone, fax, or email at:     [employer] Civil Rights Coordinator  [facility]  [address], [place] [address] [phone] (voice and fax) or TTN:439  Email: [email or web address]    American Indians can begin or continue to use [place] and [facility] ([facility]) clinics. We will not require prior approval or impose any conditions for you to get services at these clinics. For elders age 65 years and older this includes Elderly Waiver (EW) services accessed through the [facility]. If a doctor or other provider in a [facility] [place] [facility] or [facility] clinic refers you to a provider in our network, we will not require you to see your primary care provider prior to the referral.

## 2022-08-05 ENCOUNTER — ANCILLARY PROCEDURE (OUTPATIENT)
Dept: BONE DENSITY | Facility: CLINIC | Age: 82
End: 2022-08-05
Attending: INTERNAL MEDICINE
Payer: COMMERCIAL

## 2022-08-05 DIAGNOSIS — M81.0 AGE-RELATED OSTEOPOROSIS WITHOUT CURRENT PATHOLOGICAL FRACTURE: ICD-10-CM

## 2022-08-05 PROCEDURE — 77080 DXA BONE DENSITY AXIAL: CPT | Mod: TC | Performed by: RADIOLOGY

## 2022-08-15 NOTE — PROGRESS NOTES
AUDIOLOGY REPORT    SUBJECTIVE: Helen Graham is a 82 year old female who was seen in the Audiology Clinic at the [facility] for audiologic evaluation, referred by, Joellen Christensen MD. They were accompanied today by their daughter. The patient has not been seen previously in this clinic. The patient reports gradual difficulty hearing in both ears over the past couple of years. She reports difficulty understanding speech at a distance, different languages and accents, and when in different rooms of the house. Many years ago she had right aural fullness and otalgia, but that has not been current. She denies otalgia, otorrhea, fullness, dizziness, ear surgery, family history of hearing loss and noise exposure. Helen takes care of her  who has Parkinson's disease.      OBJECTIVE:  Abuse Screening:  Do you feel unsafe at home or work/school? No  Do you feel threatened by someone? No  Does anyone try to keep you from having contact with others, or doing things outside of your home? No  Physical signs of abuse present? No     Fall Risk Screen:  1. Have you fallen two or more times in the past year? No  2. Have you fallen and had an injury in the past year? No    Timed Up and Go Score (in seconds): not tested  Is patient a fall risk?   Referral initiated: No  Fall Risk Assessment Completed by Audiology    Otoscopic exam indicates small canals with largely occluding cerumen bilaterally.    Pure Tone Thresholds assessed using conventional audiometry with good reliability from 250-8000 Hz bilaterally using insert earphones and circumaural headphones. Thresholds improved significantly with inserts.    RIGHT:   Mild from 250-1000 Hz sloping to severe sensorineural hearing loss with clinically significant asymmetries from 3077-0123 Hz with a mixed component at 2000 Hz    LEFT:     Mild from 250-6000 Hz steeply sloping to severe sensorineural hearing loss  REC: F/U with ENT for ear cleaning, to  assess the asymmetry, and to obtain medical clearance for hearing aids.      Tympanogram:    RIGHT: Hypermobile eardrum mobility    LEFT:   Restricted eardrum mobility     Reflexes (reported by stimulus ear): 1000 Hz, DNT due to inability to maintain a seal.    Speech Reception Threshold:    RIGHT: 40 dB HL    LEFT:   40 dB HL    Word Recognition Score:     RIGHT: 80% at 85 dB HL using NU-6 via monitored live voice in English.    LEFT:   92% at 80 dB HL using NU-6 via monitored live voice in English.  The patient struggled to repeat the recorded words in English and a Bengla translated word list was not available.      ASSESSMENT: Today's results reveal mild sloping to severe asymmetric sensorineural hearing loss bilaterally. Today s results were discussed with the patient in detail.     PLAN: It is recommended that Helen follow-up with ENT for an ear cleaning, to assess the asymmetry, and to obtain medical clearance for hearing aids. Helen is a good hearing aid candidate at this time.The family would like to follow-up with ENT and Audiology for a hearing aid consultation in [place]. The patient was given a hearing aid information folder and instructions to call and schedule both appointments. Please call this clinic with questions regarding these results or recommendations.      Janey Cui, CCC-A  Clinical Audiologist  [place] #[id number]

## 2022-08-17 ENCOUNTER — PATIENT OUTREACH (OUTPATIENT)
Dept: GERIATRIC MEDICINE | Facility: CLINIC | Age: 82
End: 2022-08-17

## 2022-08-23 ENCOUNTER — OFFICE VISIT (OUTPATIENT)
Dept: AUDIOLOGY | Facility: CLINIC | Age: 82
End: 2022-08-23
Payer: COMMERCIAL

## 2022-08-23 DIAGNOSIS — H90.8 MIXED CONDUCTIVE AND SENSORINEURAL HEARING LOSS, UNSPECIFIED LATERALITY: ICD-10-CM

## 2022-08-23 DIAGNOSIS — H90.3 SENSORINEURAL HEARING LOSS (SNHL), BILATERAL: Primary | ICD-10-CM

## 2022-08-23 PROCEDURE — 92567 TYMPANOMETRY: CPT | Performed by: AUDIOLOGIST

## 2022-08-23 PROCEDURE — 92557 COMPREHENSIVE HEARING TEST: CPT | Performed by: AUDIOLOGIST

## 2022-09-10 ENCOUNTER — ANCILLARY PROCEDURE (OUTPATIENT)
Dept: MAMMOGRAPHY | Facility: CLINIC | Age: 82
End: 2022-09-10
Attending: INTERNAL MEDICINE
Payer: COMMERCIAL

## 2022-09-10 DIAGNOSIS — Z12.31 VISIT FOR SCREENING MAMMOGRAM: ICD-10-CM

## 2022-09-10 PROCEDURE — 77067 SCR MAMMO BI INCL CAD: CPT

## 2022-09-25 ENCOUNTER — HEALTH MAINTENANCE LETTER (OUTPATIENT)
Age: 82
End: 2022-09-25

## 2022-11-17 ENCOUNTER — OFFICE VISIT (OUTPATIENT)
Dept: OTOLARYNGOLOGY | Facility: CLINIC | Age: 82
End: 2022-11-17
Payer: COMMERCIAL

## 2022-11-17 DIAGNOSIS — H90.3 SENSORINEURAL HEARING LOSS (SNHL) OF BOTH EARS: ICD-10-CM

## 2022-11-17 DIAGNOSIS — H60.92 OTITIS EXTERNA OF LEFT EAR, UNSPECIFIED CHRONICITY, UNSPECIFIED TYPE: ICD-10-CM

## 2022-11-17 DIAGNOSIS — H61.23 BILATERAL IMPACTED CERUMEN: Primary | ICD-10-CM

## 2022-11-17 PROCEDURE — 69210 REMOVE IMPACTED EAR WAX UNI: CPT | Performed by: OTOLARYNGOLOGY

## 2022-11-17 RX ORDER — CIPROFLOXACIN AND DEXAMETHASONE 3; 1 MG/ML; MG/ML
4 SUSPENSION/ DROPS AURICULAR (OTIC) AT BEDTIME
Qty: 1 ML | Refills: 0 | Status: SHIPPED | OUTPATIENT
Start: 2022-11-17 | End: 2022-11-22

## 2022-11-17 NOTE — PROGRESS NOTES
CHIEF COMPLAINT: Patient presents with:  Consult: Hearing aid clearance, Ear wax cleaning          HISTORY OF PRESENT ILLNESS    Helen was seen at the behest of Joellen Christensen MD for ear cleaning/hearing oss.            REVIEW OF SYSTEMS    Review of Systems as per HPI and PMHx, otherwise 10 system review system are negative.       ALLERGIES    Other drug allergy (see comments)    CURRENT MEDICATIONS      Current Outpatient Medications:      calcium cit-mag-D3-Zn--eva 755--3.75 mg-mg-unit-mg Tab, [CALCIUM CIT-MAG-D3-ZN----3.75 MG-MG-UNIT-MG TAB] Take by mouth., Disp: , Rfl:      cholecalciferol, vitamin D3, (VITAMIN D3) 2,000 unit Tab, [CHOLECALCIFEROL, VITAMIN D3, (VITAMIN D3) 2,000 UNIT TAB] Take by mouth., Disp: , Rfl:      ciprofloxacin-dexamethasone (CIPRODEX) 0.3-0.1 % otic suspension, Place 4 drops Into the left ear At Bedtime for 5 days, Disp: 1 mL, Rfl: 0    Current Facility-Administered Medications:      denosumab (PROLIA) injection 60 mg, 60 mg, Subcutaneous, Q6 Months, Joellen Christensen MD, 60 mg at 06/15/22 1424     PAST MEDICAL HISTORY    PAST MEDICAL HISTORY: No past medical history on file.    PAST SURGICAL HISTORY    PAST SURGICAL HISTORY:   Past Surgical History:   Procedure Laterality Date     [facility] APPENDECTOMY      Description: Appendectomy;  Recorded: 08/09/2012;     [facility] TOTAL ABDOM HYSTERECTOMY      Description: Hysterectomy;  Recorded: 08/09/2012;       FAMILY  HISTORY    FAMILY HISTORY: No family history on file.    SOCIAL HISTORY    SOCIAL HISTORY:   Social History     Tobacco Use     Smoking status: Never     Smokeless tobacco: Never   Substance Use Topics     Alcohol use: No        PHYSICAL EXAM    HEAD: Normal appearance and symmetry:  No cutaneous lesions.      NECK:  supple     EARS: normal TM, EACs; cerumen impactions each ear    CERUMEN IMPACTION REMOVAL    After obtaining verbal consent, and using the binocular microscope.  Cerumen impaction(s) were removed  from the affected ear canal(s)  using a wire loops and/or suction.  The patient tolerated the procedure well without incident.      EYES:  EOMI    CN VII/XII:  intact     NOSE:     Dorsum:   straight  Septum:  midline  Mucosa:  moist        ORAL CAVITY/OROPHARYNX:     Lips:  Normal.  Tongue: normal, midline  Mucosa:   no lesions     NECK:  Trachea:  midline.              Thyroid:  normal              Adenopathy:  none        NEURO:   Alert and Oriented     GAIT AND STATION:  normal     RESPIRATORY:   Symmetry and Respiratory effort     PSYCH:  Normal mood and affect     SKIN:   warm and dry         IMPRESSION:    Encounter Diagnoses   Name Primary?     Bilateral impacted cerumen Yes     Sensorineural hearing loss (SNHL) of both ears      Otitis externa of left ear, unspecified chronicity, unspecified type           RECOMMENDATIONS:      Orders Placed This Encounter   Procedures     Remove Cerumen      Return visit next available with repeat audiogram

## 2022-11-17 NOTE — LETTER
11/17/2022         RE: Helen Graham  [address] [facility] [address]        Dear Colleague,    Thank you for referring your patient, Helen Graham, to the [facility]. Please see a copy of my visit note below.    CHIEF COMPLAINT: Patient presents with:  Consult: Hearing aid clearance, Ear wax cleaning          HISTORY OF PRESENT ILLNESS    Helen was seen at the behest of Joellen Christensen MD for ear cleaning/hearing oss.            REVIEW OF SYSTEMS    Review of Systems as per HPI and PMHx, otherwise 10 system review system are negative.       ALLERGIES    Other drug allergy (see comments)    CURRENT MEDICATIONS      Current Outpatient Medications:      calcium cit-mag-D3-Zn--eva 713--3.75 mg-mg-unit-mg Tab, [CALCIUM CIT-MAG-D3-ZN----3.75 MG-MG-UNIT-MG TAB] Take by mouth., Disp: , Rfl:      cholecalciferol, vitamin D3, (VITAMIN D3) 2,000 unit Tab, [CHOLECALCIFEROL, VITAMIN D3, (VITAMIN D3) 2,000 UNIT TAB] Take by mouth., Disp: , Rfl:      ciprofloxacin-dexamethasone (CIPRODEX) 0.3-0.1 % otic suspension, Place 4 drops Into the left ear At Bedtime for 5 days, Disp: 1 mL, Rfl: 0    Current Facility-Administered Medications:      denosumab (PROLIA) injection 60 mg, 60 mg, Subcutaneous, Q6 Months, Joellen Christensen MD, 60 mg at 06/15/22 8975     PAST MEDICAL HISTORY    PAST MEDICAL HISTORY: No past medical history on file.    PAST SURGICAL HISTORY    PAST SURGICAL HISTORY:   Past Surgical History:   Procedure Laterality Date     [facility] APPENDECTOMY      Description: Appendectomy;  Recorded: 08/09/2012;     [facility] TOTAL ABDOM HYSTERECTOMY      Description: Hysterectomy;  Recorded: 08/09/2012;       FAMILY  HISTORY    FAMILY HISTORY: No family history on file.    SOCIAL HISTORY    SOCIAL HISTORY:   Social History     Tobacco Use     Smoking status: Never     Smokeless tobacco: Never   Substance Use Topics     Alcohol use: No        PHYSICAL EXAM    HEAD:  Normal appearance and symmetry:  No cutaneous lesions.      NECK:  supple     EARS: normal TM, EACs; cerumen impactions each ear    CERUMEN IMPACTION REMOVAL    After obtaining verbal consent, and using the binocular microscope.  Cerumen impaction(s) were removed from the affected ear canal(s)  using a wire loops and/or suction.  The patient tolerated the procedure well without incident.      EYES:  EOMI    CN VII/XII:  intact     NOSE:     Dorsum:   straight  Septum:  midline  Mucosa:  moist        ORAL CAVITY/OROPHARYNX:     Lips:  Normal.  Tongue: normal, midline  Mucosa:   no lesions     NECK:  Trachea:  midline.              Thyroid:  normal              Adenopathy:  none        NEURO:   Alert and Oriented     GAIT AND STATION:  normal     RESPIRATORY:   Symmetry and Respiratory effort     PSYCH:  Normal mood and affect     SKIN:   warm and dry         IMPRESSION:    Encounter Diagnoses   Name Primary?     Bilateral impacted cerumen Yes     Sensorineural hearing loss (SNHL) of both ears      Otitis externa of left ear, unspecified chronicity, unspecified type           RECOMMENDATIONS:      Orders Placed This Encounter   Procedures     Remove Cerumen      Return visit next available with repeat audiogram          Again, thank you for allowing me to participate in the care of your patient.        Sincerely,        Gabriel Gomez MD

## 2022-12-21 ENCOUNTER — ALLIED HEALTH/NURSE VISIT (OUTPATIENT)
Dept: FAMILY MEDICINE | Facility: CLINIC | Age: 82
End: 2022-12-21
Payer: COMMERCIAL

## 2022-12-21 DIAGNOSIS — M81.0 AGE-RELATED OSTEOPOROSIS WITHOUT CURRENT PATHOLOGICAL FRACTURE: Primary | ICD-10-CM

## 2022-12-21 PROCEDURE — 99207 PR NO CHARGE NURSE ONLY: CPT

## 2022-12-21 PROCEDURE — 96372 THER/PROPH/DIAG INJ SC/IM: CPT | Performed by: INTERNAL MEDICINE

## 2022-12-21 NOTE — PROGRESS NOTES
"Prolia Injection Phone Screen      Screening questions have been asked 2-3 days prior to administration visit for Prolia. If any questions are answered with \"Yes,\" this phone encounter were will routed to ordering provider for further evaluation.     1.  When was the last injection?  6/15/22    2.  Has insurance for this injection been verified?  Yes    3.  Did you experience any new onset achiness or rashes that lasted for over a month with your previous Prolia injection?   No    4.  Do you have a fever over 101?F or a new deep cough that is unusual for you today? No    5.  Have you started any new medications in the last 6 months that you were told could affect your immune system? These may have been prescribed by oncologist, transplant, rheumatology, or dermatology.   No    6.  In the last 6 months have you have gastric bypass or parathyroid surgery?   No    7.  Do you plan dental work requiring drilling into the bone such as implants/extractions or oral surgery in the next 2-3 months?   No     The following steps were completed to comply with the REMS program for Prolia:  1. Ordering provider has previously reviewed information in the Medication Guide and Patient Counseling Chart, including the serious risks of Prolia  and the symptoms of each risk and have been advised to seek prompt medical attention if they have signs or symptoms of any of the serious risks.  2. Provided each patient a copy of the Medication Guide and Patient Brochure.  See MAR for administration details.   Indication: Prolia  (denosumab) is a prescription medicine used to treat osteoporosis in patients who:   Are at high risk for fracture, meaning patients who have had a fracture related to osteoporosis, or who have multiple risk factors for fracture; Cannot use another osteoporosis medicine or other osteoporosis medicines did not work well.   The timeline for early/late injections would be 4 weeks early and any time after the 6 month pedro pablo. " If a patient receives their injection late, then the subsequent injection would be 6 months from the date that they actually received the injection    Have the screening questions been asked prior to this administration? Yes      Patient informed if symptoms discussed above present prior to their administration appointment, they are to notify clinic immediately.     Joy C Steinert, [facility]

## 2023-02-02 DIAGNOSIS — Z92.29 PERSONAL HISTORY OF OTHER DRUG THERAPY: ICD-10-CM

## 2023-02-02 DIAGNOSIS — M81.0 AGE-RELATED OSTEOPOROSIS WITHOUT CURRENT PATHOLOGICAL FRACTURE: Primary | ICD-10-CM

## 2023-02-07 ENCOUNTER — PATIENT OUTREACH (OUTPATIENT)
Dept: GERIATRIC MEDICINE | Facility: CLINIC | Age: 83
End: 2023-02-07
Payer: COMMERCIAL

## 2023-02-07 NOTE — PROGRESS NOTES
[facility] Care Coordination Contact    Care Coordinator left message for Daughter Tanna to call back or email with update on Helen.     ADELAIDE Maguire  [facility]  Phone: [phone]

## 2023-02-08 ENCOUNTER — OFFICE VISIT (OUTPATIENT)
Dept: AUDIOLOGY | Facility: CLINIC | Age: 83
End: 2023-02-08
Payer: COMMERCIAL

## 2023-02-08 ENCOUNTER — OFFICE VISIT (OUTPATIENT)
Dept: OTOLARYNGOLOGY | Facility: CLINIC | Age: 83
End: 2023-02-08
Payer: COMMERCIAL

## 2023-02-08 DIAGNOSIS — H90.A31 MIXED CONDUCTIVE AND SENSORINEURAL HEARING LOSS OF RIGHT EAR WITH RESTRICTED HEARING OF LEFT EAR: Primary | ICD-10-CM

## 2023-02-08 DIAGNOSIS — H90.3 SENSORINEURAL HEARING LOSS (SNHL) OF BOTH EARS: Primary | ICD-10-CM

## 2023-02-08 DIAGNOSIS — H61.23 BILATERAL IMPACTED CERUMEN: ICD-10-CM

## 2023-02-08 DIAGNOSIS — H90.3 SENSORINEURAL HEARING LOSS (SNHL) OF BOTH EARS: ICD-10-CM

## 2023-02-08 PROCEDURE — 92557 COMPREHENSIVE HEARING TEST: CPT | Performed by: AUDIOLOGIST

## 2023-02-08 PROCEDURE — 92567 TYMPANOMETRY: CPT | Performed by: AUDIOLOGIST

## 2023-02-08 PROCEDURE — 99213 OFFICE O/P EST LOW 20 MIN: CPT | Mod: 25 | Performed by: OTOLARYNGOLOGY

## 2023-02-08 PROCEDURE — 69210 REMOVE IMPACTED EAR WAX UNI: CPT | Performed by: OTOLARYNGOLOGY

## 2023-02-08 NOTE — PROGRESS NOTES
AUDIOLOGY REPORT    SUMMARY: Audiology visit completed. See audiogram for results. Abuse screening not completed due to same day appt with ENT clinic, where this is addressed.    Hearing sensitivity was essentially stable, bilaterally, per 8-23-22 audiogram.      RECOMMENDATIONS: Follow-up with ENT.      Janey Vasquez, [facility]  [place] Licensed Audiologist [id number]

## 2023-02-08 NOTE — LETTER
"    2/8/2023         RE: Helen Graham  [address] [address]        Dear Colleague,    Thank you for referring your patient, Helen Graham, to the [facility]. Please see a copy of my visit note below.    CHIEF COMPLAINT:  Patient presents with:  Follow Up: Feels her hearing is ok she just thinks when people are further a way she has a hard time hearing them          HISTORY OF PRESENT ILLNESS    Helen was seen in follow up after previous 11/17/2022 visit for audiogram review.   She states that she has difficulty when people are \"far away\" and at [place].  She denies ear pain or dizziness.  No new ENT issues.  She is open to the idea of hearing aids.     AUDIOLOGY NOTE:    08/23/2022  Speech Reception/Detection Threshold  RIGHT  Air 40 dB HL [SRT]  LEFT  Air 40 dB HL [SRT]  Word Recognition  RIGHT  68% at 90 dB HL [60]  80% at 85 dB HL [55]  LEFT  92% at 80 dB HL [55]  NU-6 List 2A,NU-6 List 3A  Word Rec. Word List#  *- masked values, masking dB HL in [...]  Pure Tone Average (PTA)  RIGHT LEFT  Air 47 dB HL [4c] Air 34 dB HL [4c]  Monaural  Room:  HX: Accompanied by daughter. Helen reports gradual difficulty hearing in both ears over the past couple of years. She reports difficulty  understanding speech at a distance, different languages and accents, and when in different rooms of the house. Many years ago she had  right aural fullness and otalgia, but that has not been current. She denies otalgia, otorrhea, fullness, dizziness, ear surgery, family history of  hearing loss and noise exposure.  RESULTS: Small canals and largely occluding cerumen bilaterally. Tymps: Type Ad right, Type As left. DNT reflexes due to inability to  maintain a seal. Audio: L: Mild from 250-6000 Hz steeply sloping to severe sensorineural hearing loss, R: Mild from 250-1000 Hz sloping to  severe sensorineural hearing loss with clinically significant asymmetries from 9924-7456 Hz and a " Recommend Retina consult. mixed component at 2 kHz. SRTs agree  with PTAs. Word rec: Pt. had trouble with recorded words and there was no translated word list in [facility]. English words presented via  MLV show: R: 80%, L: 92%. Thresholds improved considerably with inserts.  REC: F/U with ENT for ear cleaning, to assess the asymmetry, and to obtain medical clearance for hearing aids.      REVIEW OF SYSTEMS    Review of Systems: a 10-system review is reviewed at this encounter.  See scanned document.       Allergies   Allergen Reactions     Other Drug Allergy (See Comments) Unknown     Stomatil           PHYSICAL EXAM:        HEAD: Normal appearance and symmetry:  No cutaneous lesions.      EARS:   Inspissated cerumen present AU     RIGHT:  {external auditory canals clear, tympanic membranes normal   LEFT:    {external auditory canals clear, tympanic membranes normal    CERUMEN IMPACTION REMOVAL    After obtaining verbal consent, and using the binocular microscope.  Cerumen impaction(s) were removed from the affected ear canal(s)  using a wire loops and/or suction.  The patient tolerated the procedure well without incident. (inspissated cerumen/debris)       NOSE:    Dorsum:   straight       ORAL CAVITY/OROPHARYNX:    Lips:  Normal.     NECK:  Trachea:  midline     NEURO:   Alert and Oriented    GAIT AND STATION:  normal     RESPIRATORY:   Symmetry and Respiratory effort    PSYCH:   normal mood and affect    SKIN:  warm and dry       AUDIOGRAM:  Right sided mixed loss with WR score of 72% at 85 dB.  Slight negative pressure on right.  Left ear with 15dB drop at 2000 hz compared to previous audiogram.       IMPRESSION:   Encounter Diagnoses   Name Primary?     Mixed conductive and sensorineural hearing loss of right ear with restricted hearing of left ear Yes     Bilateral impacted cerumen             RECOMMENDATIONS:    Orders Placed This Encounter   Procedures     Remove Cerumen     Adult Audiology  Referral           Again, thank you  for allowing me to participate in the care of your patient.        Sincerely,        Gabriel Gomez MD

## 2023-02-08 NOTE — PROGRESS NOTES
"CHIEF COMPLAINT:  Patient presents with:  Follow Up: Feels her hearing is ok she just thinks when people are further a way she has a hard time hearing them          HISTORY OF PRESENT ILLNESS    Helen was seen in follow up after previous 11/17/2022 visit for audiogram review.   She states that she has difficulty when people are \"far away\" and at [place].  She denies ear pain or dizziness.  No new ENT issues.  She is open to the idea of hearing aids.     AUDIOLOGY NOTE:    08/23/2022  Speech Reception/Detection Threshold  RIGHT  Air 40 dB HL [SRT]  LEFT  Air 40 dB HL [SRT]  Word Recognition  RIGHT  68% at 90 dB HL [60]  80% at 85 dB HL [55]  LEFT  92% at 80 dB HL [55]  NU-6 List 2A,NU-6 List 3A  Word Rec. Word List#  *- masked values, masking dB HL in [...]  Pure Tone Average (PTA)  RIGHT LEFT  Air 47 dB HL [4c] Air 34 dB HL [4c]  Monaural  Room:  HX: Accompanied by daughter. Helen reports gradual difficulty hearing in both ears over the past couple of years. She reports difficulty  understanding speech at a distance, different languages and accents, and when in different rooms of the house. Many years ago she had  right aural fullness and otalgia, but that has not been current. She denies otalgia, otorrhea, fullness, dizziness, ear surgery, family history of  hearing loss and noise exposure.  RESULTS: Small canals and largely occluding cerumen bilaterally. Tymps: Type Ad right, Type As left. DNT reflexes due to inability to  maintain a seal. Audio: L: Mild from 250-6000 Hz steeply sloping to severe sensorineural hearing loss, R: Mild from 250-1000 Hz sloping to  severe sensorineural hearing loss with clinically significant asymmetries from 2681-8639 Hz and a mixed component at 2 kHz. SRTs agree  with PTAs. Word rec: Pt. had trouble with recorded words and there was no translated word list in [facility]. English words presented via  MLV show: R: 80%, L: 92%. Thresholds improved considerably with inserts.  REC: F/U with " "ENT for ear cleaning, to assess the asymmetry, and to obtain medical clearance for hearing aids.      REVIEW OF SYSTEMS    Review of Systems: a 10-system review is reviewed at this encounter.  See scanned document.       Allergies   Allergen Reactions     Other Drug Allergy (See Comments) Unknown     Stomatil           PHYSICAL EXAM:        HEAD: Normal appearance and symmetry:  No cutaneous lesions.      EARS:   Inspissated cerumen present AU     RIGHT:  {external auditory canals clear, tympanic membranes normal   LEFT:    {external auditory canals clear, tympanic membranes normal    CERUMEN IMPACTION REMOVAL    After obtaining verbal consent, and using the binocular microscope.  Cerumen impaction(s) were removed from the affected ear canal(s)  using a wire loops and/or suction.  The patient tolerated the procedure well without incident. (inspissated cerumen/debris)       NOSE:    Dorsum:   straight       ORAL CAVITY/OROPHARYNX:    Lips:  Normal.     NECK:  Trachea:  midline     NEURO:   Alert and Oriented    GAIT AND STATION:  normal     RESPIRATORY:   Symmetry and Respiratory effort    PSYCH:   normal mood and affect    SKIN:  warm and dry       AUDIOGRAM:  Right sided mixed loss with WR score of 72% at 85 dB.  Slight negative pressure on right.  Left ear with 15dB drop at 2000 hz compared to previous audiogram.       IMPRESSION:   Encounter Diagnoses   Name Primary?     Mixed conductive and sensorineural hearing loss of right ear with restricted hearing of left ear Yes     Bilateral impacted cerumen      Discussed effect of hearing loss and cognition.  Recommend hearing aid consultation. Advised them to look into wether her [employer] is \"looped\" for the hearing impaired.            RECOMMENDATIONS:    Orders Placed This Encounter   Procedures     Remove Cerumen     Adult Audiology  Referral     Hearing aid consult.  Return annually for hearing test.   "

## 2023-02-10 ENCOUNTER — IMMUNIZATION (OUTPATIENT)
Dept: NURSING | Facility: CLINIC | Age: 83
End: 2023-02-10
Payer: COMMERCIAL

## 2023-02-10 PROCEDURE — 91312 COVID-19 VACCINE BIVALENT BOOSTER 12+ (PFIZER): CPT

## 2023-02-10 PROCEDURE — 0124A COVID-19 VACCINE BIVALENT BOOSTER 12+ (PFIZER): CPT

## 2023-02-14 ENCOUNTER — PATIENT OUTREACH (OUTPATIENT)
Dept: GERIATRIC MEDICINE | Facility: CLINIC | Age: 83
End: 2023-02-14
Payer: COMMERCIAL

## 2023-02-14 NOTE — PROGRESS NOTES
[facility] Care Coordination Contact      [facility] Six-Month Telephone Assessment    6 month telephone assessment completed on 02/14/23.    ER visits: No  Hospitalizations: No  TCU stays: No  Significant health status changes: None  Falls/Injuries: No  ADL/IADL changes: No  Changes in services: No    Caregiver Assessment follow up:  NA    Goals: See POC in chart for goal progress documentation.    Care Coordinator reached out to Aruna Cisse.   She reported that Helen is doing well and no changes.       Will see member in 6 months for an annual health risk assessment.   Encouraged member to call CC with any questions or concerns in the meantime.     ADELAIDE Maguire  [facility]  Phone: [phone]

## 2023-03-23 NOTE — PROGRESS NOTES
Assessment and Plan:     Patient has been advised of split billing requirements and indicates understanding: Yes  1. Routine general medical examination at a health care facility    - Mammo Screening Bilateral; Future    2. Age-related osteoporosis without current pathological fracture  She will complete 2 years of Forteo treatment in February of 2021. DXA done last year showed significant improvement comparing to the scan in 2019. We are planning to switch to Prolia in March.  - Urinalysis-UC if Indicated  - Vitamin D, Total (25-Hydroxy)  - HM2(CBC w/o Differential)  - Comprehensive Metabolic Panel  - Bone Specific Alkaline Phosphatase  - denosumab 60 mg (PROLIA 60 mg/ml)    3. Gastroesophageal reflux disease with esophagitis without hemorrhage    - Urinalysis-UC if Indicated    4. Chronic Reflux Esophagitis  She did not take omeprazole for many months but would like to try it again, since she has heartburn symptoms again.  - omeprazole (PRILOSEC) 20 MG capsule; TAKE 1 CAPSULE(20 MG) BY MOUTH DAILY  Dispense: 90 capsule; Refill: 3    5. Dyslipidemia    - Urinalysis-UC if Indicated  - Lipid Profile    6. Personal history of other drug therapy    - denosumab 60 mg (PROLIA 60 mg/ml)     The patient's current medical problems were reviewed.    I have had an Advance Directives discussion with the patient.  The following health maintenance schedule was reviewed with the patient and provided in printed form in the after visit summary:   Health Maintenance   Topic Date Due     HEPATITIS C SCREENING  1940     ZOSTER VACCINES (1 of 2) 02/15/1992     MEDICARE ANNUAL WELLNESS VISIT  01/06/2022     FALL RISK ASSESSMENT  01/06/2022     TD 18+ HE  08/09/2022     LIPID  01/11/2024     ADVANCE CARE PLANNING  03/10/2025     DEXA SCAN  01/31/2035     Pneumococcal Vaccine: 65+ Years  Completed     INFLUENZA VACCINE RULE BASED  Completed     Pneumococcal Vaccine: Pediatrics (0 to 5 Years) and At-Risk Patients (6 to 64 Years)   Aged Out     HEPATITIS B VACCINES  Aged Out        Subjective:   Chief Complaint: Helen Graham is an 80 y.o. female here for an Annual Wellness visit.   HPI:  Acute and chronic medical problems discussed as above.    Review of Systems:    Please see above.  The rest of the review of systems are negative for all systems.    Patient Care Team:  Joellen Christensen MD as PCP - General (Internal Medicine)  Hortencia Davis SW as Lead Care Coordinator (Primary Care - CC)  Joellen Christensen MD as Assigned PCP     Patient Active Problem List   Diagnosis     Chronic Reflux Esophagitis     Malabsorption Syndrome     Osteoporosis     No past medical history on file.   Past Surgical History:   Procedure Laterality Date     DC APPENDECTOMY      Description: Appendectomy;  Recorded: 08/09/2012;     DC TOTAL ABDOM HYSTERECTOMY      Description: Hysterectomy;  Recorded: 08/09/2012;      No family history on file.   Social History     Socioeconomic History     Marital status:      Spouse name: Not on file     Number of children: Not on file     Years of education: Not on file     Highest education level: Not on file   Occupational History     Not on file   Social Needs     Financial resource strain: Not on file     Food insecurity     Worry: Not on file     Inability: Not on file     Transportation needs     Medical: Not on file     Non-medical: Not on file   Tobacco Use     Smoking status: Never Smoker     Smokeless tobacco: Never Used   Substance and Sexual Activity     Alcohol use: No     Frequency: Never     Drug use: No     Sexual activity: Not on file   Lifestyle     Physical activity     Days per week: Not on file     Minutes per session: Not on file     Stress: Not on file   Relationships     Social connections     Talks on phone: Not on file     Gets together: Not on file     Attends Latter-day service: Not on file     Active member of club or organization: Not on file     Attends meetings of clubs or organizations:  "Not on file     Relationship status: Not on file     Intimate partner violence     Fear of current or ex partner: Not on file     Emotionally abused: Not on file     Physically abused: Not on file     Forced sexual activity: Not on file   Other Topics Concern     Not on file   Social History Narrative     Not on file      Current Outpatient Medications   Medication Sig Dispense Refill     cholecalciferol, vitamin D3, (VITAMIN D3) 2,000 unit Tab Take by mouth.       FORTEO 20 mcg/dose (600mcg/2.4mL) injection INJECT 20 MCG UNDER THE SKIN DAILY 2.4 mL 2     omeprazole (PRILOSEC) 20 MG capsule TAKE 1 CAPSULE(20 MG) BY MOUTH DAILY 90 capsule 3     pen needle, diabetic (SURE-FINE PEN NEEDLES) 31 gauge x 3/16\" Ndle Use daily with Forteo 100 each 3     No current facility-administered medications for this visit.       Objective:   Vital Signs:   Visit Vitals  /72   Pulse 68   Ht 4' 11.5\" (1.511 m)   Wt 103 lb (46.7 kg)   SpO2 97%   BMI 20.46 kg/m           VisionScreening:  No exam data present     PHYSICAL EXAM  General Appearance: Alert, cooperative, no distress, appears stated age.  Head: Normocephalic, without obvious abnormality, atraumatic  Eyes: PERRL, conjunctiva/corneas clear, EOM's intact  Ears: Normal TM's and external ear canals, both ears  Nose: Nares normal, septum midline,mucosa normal, no drainage  Throat: Lips, mucosa, and tongue normal; teeth and gums normal  Neck: Supple, symmetrical, trachea midline, no adenopathy;  thyroid: not enlarged, symmetric, no tenderness/mass/nodules; no carotid bruit or JVD  Back: Symmetric, no curvature, ROM normal, no CVA tenderness.  Lungs: Clear to auscultation bilaterally, respirations unlabored.  Breasts: No breast masses, tenderness, asymmetry, or nipple discharge.  Heart: Regular rate and rhythm, S1 and S2 normal, no murmur, rub, or gallop.  Abdomen: Soft, non-tender, bowel sounds active all four quadrants,  no masses, no " organomegaly.  Pelvic:declined  Extremities: Extremities normal, atraumatic, no cyanosis or edema.  Skin: Skin color, texture, turgor normal, no rashes or lesions.  Lymph nodes: Cervical, supraclavicular, and axillary nodes normal.  Neurologic: No focal neurological findings.      Assessment Results 1/6/2021   Activities of Daily Living No help needed   Instrumental Activities of Daily Living No help needed   Get Up and Go Score -   Mini Cog Total Score 5   Some recent data might be hidden     A Mini-Cog score of 0-2 suggests the possibility of dementia, score of 3-5 suggests no dementia    Identified Health Risks:     Patient's advanced directive was discussed and I am comfortable with the patient's wishes.         Protopic Pregnancy And Lactation Text: This medication is Pregnancy Category C. It is unknown if this medication is excreted in breast milk when applied topically.

## 2023-03-27 NOTE — PROGRESS NOTES
AUDIOLOGY REPORT    SUBJECTIVE: Helen Graham is a 83 year old female was seen in the Audiology Clinic at  [facility] on 3/28/23 to discuss concerns with hearing and functional communication difficulties. The patient was accompanied by their daughter, Debbie. 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  The patient was medically evaluated and determined to be cleared for binaural hearing aids by Dr. Gabriel Gomez. Helen notes difficulty with communication in a variety of listening situations.    Patient lives with her daughter, Debbie, and her son-in-law. She reports she has been caring for her late  for the past 5 years and did not notice she wasn't hearing very well. Now that she is getting out of the house more, she is struggling to hear people, especially if they are at a distance. She is also having difficulty hearing in her temple on Sunday's. Daughter does not think they have a loop system. Patient reports difficulty understanding English accents at times. She does use her iPhone and the whatsAPP to communicate with people in [place]. She mainly uses her phone at night. She is interested in something that would connect with her cell phone.     OBJECTIVE:  Abuse Screening:  Do you feel unsafe at home or work/school? No  Do you feel threatened by someone? No  Does anyone try to keep you from having contact with others, or doing things outside of your home? No  Physical signs of abuse present? No    Patient is a hearing aid candidate. Patient would like to move forward with a hearing aid evaluation today. Therefore, the patient was presented with different options for amplification to help aid in communication. Discussed styles, levels of technology and monaural vs. binaural fitting.     The hearing aid(s) mutually chosen  were:  Binaural: Phonak Audeo Paradise 70-Rechargeable  COLOR: Titanium Grey  BATTERY SIZE: rechargeable  EARMOLD/TIPS: To be determined at fitting  CANAL/ LENGTH: 1M right and left    Otoscopy revealed ears are clear of cerumen bilaterally.     ASSESSMENT:     Reviewed purchase information and warranty information with patient. The 45 day trial period was explained to patient. The patient was given a copy of the [place] Department [employer] consumer brochure on purchasing hearing instruments. Patient risk factors have been provided to the patient in writing prior to the sale of the hearing aid per FDA regulation. The risk factors are also available in the User Instructional Booklet to be presented on the day of the hearing aid fitting. Hearing aids ordered. Hearing aid evaluation completed.    PLAN: Helen is scheduled to return on 5/2/2023 for a hearing aid fitting and programming. Purchase agreement will be completed on that date. Please contact this clinic with any questions or concerns.    Janey Singleton, CCC-A  [place] Licensed Audiologist #[id number]

## 2023-03-28 ENCOUNTER — OFFICE VISIT (OUTPATIENT)
Dept: INTERNAL MEDICINE | Facility: CLINIC | Age: 83
End: 2023-03-28
Payer: COMMERCIAL

## 2023-03-28 ENCOUNTER — OFFICE VISIT (OUTPATIENT)
Dept: AUDIOLOGY | Facility: CLINIC | Age: 83
End: 2023-03-28
Payer: COMMERCIAL

## 2023-03-28 VITALS
DIASTOLIC BLOOD PRESSURE: 72 MMHG | WEIGHT: 102.8 LBS | BODY MASS INDEX: 20.72 KG/M2 | HEIGHT: 59 IN | RESPIRATION RATE: 16 BRPM | SYSTOLIC BLOOD PRESSURE: 120 MMHG | TEMPERATURE: 97.9 F | OXYGEN SATURATION: 97 % | HEART RATE: 62 BPM

## 2023-03-28 DIAGNOSIS — Z23 NEED FOR SHINGLES VACCINE: ICD-10-CM

## 2023-03-28 DIAGNOSIS — G62.9 NEUROPATHY: ICD-10-CM

## 2023-03-28 DIAGNOSIS — M81.0 AGE-RELATED OSTEOPOROSIS WITHOUT CURRENT PATHOLOGICAL FRACTURE: ICD-10-CM

## 2023-03-28 DIAGNOSIS — Z23 NEED FOR DIPHTHERIA-TETANUS-PERTUSSIS (TDAP) VACCINE: ICD-10-CM

## 2023-03-28 DIAGNOSIS — Z12.31 VISIT FOR SCREENING MAMMOGRAM: ICD-10-CM

## 2023-03-28 DIAGNOSIS — R93.89 ABNORMAL FINDINGS ON DIAGNOSTIC IMAGING OF OTHER SPECIFIED BODY STRUCTURES: ICD-10-CM

## 2023-03-28 DIAGNOSIS — H90.A31 MIXED CONDUCTIVE AND SENSORINEURAL HEARING LOSS OF RIGHT EAR WITH RESTRICTED HEARING OF LEFT EAR: ICD-10-CM

## 2023-03-28 DIAGNOSIS — Z13.220 LIPID SCREENING: ICD-10-CM

## 2023-03-28 DIAGNOSIS — R79.9 ABNORMAL FINDING OF BLOOD CHEMISTRY, UNSPECIFIED: ICD-10-CM

## 2023-03-28 DIAGNOSIS — K21.00 GASTROESOPHAGEAL REFLUX DISEASE WITH ESOPHAGITIS WITHOUT HEMORRHAGE: ICD-10-CM

## 2023-03-28 DIAGNOSIS — Z00.00 ENCOUNTER FOR MEDICARE ANNUAL WELLNESS EXAM: Primary | ICD-10-CM

## 2023-03-28 LAB
ALBUMIN SERPL BCG-MCNC: 4.2 G/DL (ref 3.5–5.2)
ALP SERPL-CCNC: 84 U/L (ref 35–104)
ALT SERPL W P-5'-P-CCNC: 18 U/L (ref 10–35)
ANION GAP SERPL CALCULATED.3IONS-SCNC: 10 MMOL/L (ref 7–15)
AST SERPL W P-5'-P-CCNC: 25 U/L (ref 10–35)
BILIRUB SERPL-MCNC: 0.2 MG/DL
BUN SERPL-MCNC: 19.1 MG/DL (ref 8–23)
CALCIUM SERPL-MCNC: 9.7 MG/DL (ref 8.8–10.2)
CHLORIDE SERPL-SCNC: 104 MMOL/L (ref 98–107)
CHOLEST SERPL-MCNC: 223 MG/DL
CREAT SERPL-MCNC: 0.77 MG/DL (ref 0.51–0.95)
DEPRECATED HCO3 PLAS-SCNC: 27 MMOL/L (ref 22–29)
ERYTHROCYTE [DISTWIDTH] IN BLOOD BY AUTOMATED COUNT: 12.9 % (ref 10–15)
GFR SERPL CREATININE-BSD FRML MDRD: 77 ML/MIN/1.73M2
GLUCOSE SERPL-MCNC: 122 MG/DL (ref 70–99)
HBA1C MFR BLD: 5.9 % (ref 0–5.6)
HCT VFR BLD AUTO: 43 % (ref 35–47)
HDLC SERPL-MCNC: 58 MG/DL
HGB BLD-MCNC: 13.7 G/DL (ref 11.7–15.7)
LDLC SERPL CALC-MCNC: 117 MG/DL
MAGNESIUM SERPL-MCNC: 2.1 MG/DL (ref 1.7–2.3)
MCH RBC QN AUTO: 29.7 PG (ref 26.5–33)
MCHC RBC AUTO-ENTMCNC: 31.9 G/DL (ref 31.5–36.5)
MCV RBC AUTO: 93 FL (ref 78–100)
NONHDLC SERPL-MCNC: 165 MG/DL
PLATELET # BLD AUTO: 287 10E3/UL (ref 150–450)
POTASSIUM SERPL-SCNC: 4.3 MMOL/L (ref 3.4–5.3)
PROT SERPL-MCNC: 7.8 G/DL (ref 6.4–8.3)
RBC # BLD AUTO: 4.62 10E6/UL (ref 3.8–5.2)
SODIUM SERPL-SCNC: 141 MMOL/L (ref 136–145)
TRIGL SERPL-MCNC: 239 MG/DL
TSH SERPL DL<=0.005 MIU/L-ACNC: 2.05 UIU/ML (ref 0.3–4.2)
WBC # BLD AUTO: 7.6 10E3/UL (ref 4–11)

## 2023-03-28 PROCEDURE — 84443 ASSAY THYROID STIM HORMONE: CPT | Performed by: INTERNAL MEDICINE

## 2023-03-28 PROCEDURE — G0439 PPPS, SUBSEQ VISIT: HCPCS | Performed by: INTERNAL MEDICINE

## 2023-03-28 PROCEDURE — 36415 COLL VENOUS BLD VENIPUNCTURE: CPT | Performed by: INTERNAL MEDICINE

## 2023-03-28 PROCEDURE — 99000 SPECIMEN HANDLING OFFICE-LAB: CPT | Performed by: INTERNAL MEDICINE

## 2023-03-28 PROCEDURE — 84207 ASSAY OF VITAMIN B-6: CPT | Mod: 90 | Performed by: INTERNAL MEDICINE

## 2023-03-28 PROCEDURE — 99213 OFFICE O/P EST LOW 20 MIN: CPT | Mod: 25 | Performed by: INTERNAL MEDICINE

## 2023-03-28 PROCEDURE — 82306 VITAMIN D 25 HYDROXY: CPT | Performed by: INTERNAL MEDICINE

## 2023-03-28 PROCEDURE — 83036 HEMOGLOBIN GLYCOSYLATED A1C: CPT | Performed by: INTERNAL MEDICINE

## 2023-03-28 PROCEDURE — 83735 ASSAY OF MAGNESIUM: CPT | Performed by: INTERNAL MEDICINE

## 2023-03-28 PROCEDURE — 80053 COMPREHEN METABOLIC PANEL: CPT | Performed by: INTERNAL MEDICINE

## 2023-03-28 PROCEDURE — 92591 PR HEARING AID EXAM BINAURAL: CPT | Performed by: AUDIOLOGIST

## 2023-03-28 PROCEDURE — 84425 ASSAY OF VITAMIN B-1: CPT | Mod: 90 | Performed by: INTERNAL MEDICINE

## 2023-03-28 PROCEDURE — 85027 COMPLETE CBC AUTOMATED: CPT | Performed by: INTERNAL MEDICINE

## 2023-03-28 PROCEDURE — 80061 LIPID PANEL: CPT | Performed by: INTERNAL MEDICINE

## 2023-03-28 PROCEDURE — 86618 LYME DISEASE ANTIBODY: CPT | Performed by: INTERNAL MEDICINE

## 2023-03-28 ASSESSMENT — ENCOUNTER SYMPTOMS
FREQUENCY: 0
SHORTNESS OF BREATH: 0
COUGH: 0
JOINT SWELLING: 0
NAUSEA: 0
DYSURIA: 0
HEARTBURN: 0
FEVER: 0
CONSTIPATION: 0
WEAKNESS: 0
CHILLS: 0
DIARRHEA: 0
HEMATOCHEZIA: 0
ARTHRALGIAS: 0
HEADACHES: 0
ABDOMINAL PAIN: 0
HEMATURIA: 0
DIZZINESS: 0
EYE PAIN: 0
MYALGIAS: 0
BREAST MASS: 0
SORE THROAT: 0
PALPITATIONS: 0
NERVOUS/ANXIOUS: 0
PARESTHESIAS: 0

## 2023-03-28 ASSESSMENT — ACTIVITIES OF DAILY LIVING (ADL): CURRENT_FUNCTION: TRANSPORTATION REQUIRES ASSISTANCE

## 2023-03-28 NOTE — PROGRESS NOTES
The patient reports that she has difficulty with activities of daily living. I have asked that the patient make a follow up appointment in *** weeks where this issue will be further evaluated and addressed.  The patient was provided with written information regarding signs of hearing loss.

## 2023-03-28 NOTE — PATIENT INSTRUCTIONS
Check with your insurance if tetanus vaccine is covered.    Prolia # 4 in June 2023 with my nurse.  I will see you in December.   DXA scan in  8/2024.    Labs today.    Mammogram in Sep 2023. [phone]    Start omeprazole daily.      Patient Education   Personalized Prevention Plan  You are due for the preventive services outlined below.  Your care team is available to assist you in scheduling these services.  If you have already completed any of these items, please share that information with your care team to update in your medical record.  Health Maintenance Due   Topic Date Due    ANNUAL REVIEW OF HM ORDERS  Never done    Zoster (Shingles) Vaccine (1 of 2) Never done    Diptheria Tetanus Pertussis (DTAP/TDAP/TD) Vaccine (2 - Td or Tdap) 08/09/2022    Flu Vaccine (1) 09/01/2022     Activities of Daily Living    Your Health Risk Assessment indicates you have difficulties with activities of daily living such as housework, bathing, preparing meals, taking medication, etc. Please make a follow up appointment for us to address this issue in more detail.    Signs of Hearing Loss  Hearing loss is a problem shared by many people. In fact, it's one of the most common health problems, particularly as people age. Most people aged 65 and older have some hearing loss. By age 80, almost everyone does. Hearing loss often occurs slowly over the years. So, you may not realize your hearing has gotten worse.   When sudden hearing loss occurs, it's important to contact your healthcare provider right away. Your provider will do a medical exam and a hearing exam as soon as possible. This is to help find the cause and type of your sudden hearing loss. Based on your diagnosis, your healthcare provider will discuss possible treatments.      Hearing much better with one ear can be a sign of hearing loss.     Have your hearing checked  Call your healthcare provider if you:   Have to strain to hear normal conversation  Have to watch  other people s faces very carefully to follow what they re saying  Need to ask people to repeat what they ve said  Often misunderstand what people are saying  Turn the volume of the television or radio up so high that others complain  Feel that people are mumbling when they re talking to you  Find that the effort to hear leaves you feeling tired and irritated  Notice, when using the phone, that you hear better with one ear than the other  Kit last reviewed this educational content on 6/1/2022 2000-2022 [employer]. All rights reserved. This information is not intended as a substitute for professional medical care. Always follow your healthcare professional's instructions.

## 2023-03-28 NOTE — PROGRESS NOTES
"SUBJECTIVE:   Helen is a 83 year old who presents for Preventive Visit.  Additional Questions 3/28/2023   Roomed by LISBETH Nguyen MA   Accompanied by Daughter Tanna     Patient has been advised of split billing requirements and indicates understanding: Yes  Are you in the first 12 months of your Medicare coverage?  No    Healthy Habits:     In general, how would you rate your overall health?  Good    Frequency of exercise:  6-7 days/week    Duration of exercise:  30-45 minutes    Do you usually eat at least 4 servings of fruit and vegetables a day, include whole grains    & fiber and avoid regularly eating high fat or \"junk\" foods?  Yes    Taking medications regularly:  Yes    Medication side effects:  Other    Ability to successfully perform activities of daily living:  Transportation requires assistance    Home Safety:  No safety concerns identified    Hearing Impairment:  Difficulty following a conversation in a noisy restaurant or crowded room, difficult to understand a speaker at a public meeting or Jain service and difficulty understanding soft or whispered speech    In the past 6 months, have you been bothered by leaking of urine?  No    In general, how would you rate your overall mental or emotional health?  Good      PHQ-2 Total Score: 0    Additional concerns today:  No      Have you ever done Advance Care Planning? (For example, a Health Directive, POLST, or a discussion with a medical provider or your loved ones about your wishes): Yes, advance care planning is on file.       Fallen 2 or more times in the past year?: No  Any fall with injury in the past year?: No    Cognitive Screening   1) Repeat 3 items (Leader, Season, Table)    2) Clock draw: NORMAL  3) 3 item recall: Recalls 2 objects   Results: NORMAL clock, 1-2 items recalled: COGNITIVE IMPAIRMENT LESS LIKELY     Do you have sleep apnea, excessive snoring or daytime drowsiness?: no    Reviewed and updated as needed this visit by clinical staff   " Tobacco  Allergies  Meds              Reviewed and updated as needed this visit by Provider                 Social History     Tobacco Use     Smoking status: Never     Smokeless tobacco: Never   Substance Use Topics     Alcohol use: No         Alcohol Use 3/28/2023   Prescreen: >3 drinks/day or >7 drinks/week? Not Applicable     Do you have a current opioid prescription? No  Do you use any other controlled substances or medications that are not prescribed by a provider? None              Current providers sharing in care for this patient include:   Patient Care Team:  Joellen Christensen MD as PCP - General (Internal Medicine)  Hortencia Davis as Lead Care Coordinator (Primary Care - CC)  Joellen Christensen MD as Assigned PCP  Joellen Christensen MD as Physician (Internal Medicine)  Gabriel Gomez MD as MD (Otolaryngology)  Gabriel Gomez MD as Assigned Surgical Provider  Serene Krueger AuD as Audiologist (Audiology)    The following health maintenance items are reviewed in Epic and correct as of today:  Health Maintenance   Topic Date Due     ANNUAL REVIEW OF HM ORDERS  Never done     ZOSTER IMMUNIZATION (1 of 2) Never done     DTAP/TDAP/TD IMMUNIZATION (2 - Td or Tdap) 08/09/2022     INFLUENZA VACCINE (1) 09/01/2022     MEDICARE ANNUAL WELLNESS VISIT  03/28/2024     FALL RISK ASSESSMENT  03/28/2024     ADVANCE CARE PLANNING  03/28/2028     DEXA  08/05/2037     PHQ-2 (once per calendar year)  Completed     Pneumococcal Vaccine: 65+ Years  Completed     COVID-19 Vaccine  Completed     IPV IMMUNIZATION  Aged Out     MENINGITIS IMMUNIZATION  Aged Out             Pertinent mammograms are reviewed under the imaging tab.    Review of Systems   Constitutional: Negative for chills and fever.   HENT: Negative for congestion, ear pain, hearing loss and sore throat.    Eyes: Negative for pain and visual disturbance.   Respiratory: Negative for cough and shortness of breath.    Cardiovascular: Negative for chest pain,  "palpitations and peripheral edema.   Gastrointestinal: Negative for abdominal pain, constipation, diarrhea, heartburn, hematochezia and nausea.   Breasts:  Negative for tenderness, breast mass and discharge.   Genitourinary: Negative for dysuria, frequency, genital sores, hematuria, pelvic pain, urgency, vaginal bleeding and vaginal discharge.   Musculoskeletal: Negative for arthralgias, joint swelling and myalgias.   Skin: Negative for rash.   Neurological: Negative for dizziness, weakness, headaches and paresthesias.   Psychiatric/Behavioral: Negative for mood changes. The patient is not nervous/anxious.          OBJECTIVE:   /72 (BP Location: Right arm, Patient Position: Sitting, Cuff Size: Adult Regular)   Pulse 62   Temp 97.9  F (36.6  C) (Oral)   Resp 16   Ht 1.499 m (4' 11\")   Wt 46.6 kg (102 lb 12.8 oz)   SpO2 97%   BMI 20.76 kg/m   Estimated body mass index is 20.76 kg/m  as calculated from the following:    Height as of this encounter: 1.499 m (4' 11\").    Weight as of this encounter: 46.6 kg (102 lb 12.8 oz).  Physical Exam  General Appearance: Alert, cooperative, no distress, appears stated age.  Head: Normocephalic, without obvious abnormality, atraumatic  Eyes: PERRL, conjunctiva/corneas clear, EOM's intact  Ears: Normal TM's and external ear canals, both ears  Nose: Nares normal, septum midline,mucosa normal, no drainage  Throat: Lips, mucosa, and tongue normal; teeth and gums normal  Neck: Supple, symmetrical, trachea midline, no adenopathy;  thyroid: not enlarged, symmetric, no tenderness/mass/nodules; no carotid bruit or JVD  Back: Symmetric, no curvature, ROM normal, no CVA tenderness.  Lungs: Clear to auscultation bilaterally, respirations unlabored.  Breasts: No breast masses, tenderness, asymmetry, or nipple discharge.  Heart: Regular rate and rhythm, S1 and S2 normal, no murmur, rub, or gallop.  Abdomen: Soft, non-tender, bowel sounds active all four quadrants,  no masses, no " organomegaly.  Extremities: Extremities normal, atraumatic, no cyanosis or edema.  Skin: Skin color, texture, turgor normal, no rashes or lesions.  Lymph nodes: Cervical, supraclavicular, and axillary nodes normal.  Neurologic: No focal neurological findings.          ASSESSMENT / PLAN:   (Z00.00) Encounter for Medicare annual wellness exam  (primary encounter diagnosis)      (Z23) Need for diphtheria-tetanus-pertussis (Tdap) vaccine  They will check with the insurance first.    (M81.0) Age-related osteoporosis without current pathological fracture  Comment: Due for Prolia # 4 in June 2023. Due for DXA in 8/2024.  Plan: Vitamin D Deficiency            (K21.00) Gastroesophageal reflux disease with esophagitis without hemorrhage  Comment: still had heartburn daily. She will restart omeprazole.  Plan: CBC with platelets, Comprehensive metabolic         panel, omeprazole (PRILOSEC) 20 MG DR capsule            (Z13.220) Lipid screening  Comment:   Plan: Lipid panel reflex to direct LDL Non-fasting            (Z12.31) Visit for screening mammogram  Comment:   Plan: MA Screen Bilateral w/Solo            (G62.9) Neuropathy  Comment: She has a strange feeling in her feer, like walking on the soft pillow. No pain, tingling, numbness. She is a vegeterian.  Plan: Vitamin B6, Vitamin B1 whole blood, Magnesium,         TSH with free T4 reflex, Lyme Disease Total Abs        Bld with Reflex to Confirm CLIA, Vitamin B1         whole blood, Hemoglobin A1c            (R93.89) Abnormal findings on diagnostic imaging of other specified body structures  Comment:   Plan: TSH with free T4 reflex            (R79.9) Abnormal finding of blood chemistry, unspecified  Comment:   Plan: Hemoglobin A1c              Patient has been advised of split billing requirements and indicates understanding: Yes      COUNSELING:  Reviewed preventive health counseling, as reflected in patient instructions       Fall risk prevention        She reports that she  has never smoked. She has never used smokeless tobacco.      Appropriate preventive services were discussed with this patient, including applicable screening as appropriate for cardiovascular disease, diabetes, osteopenia/osteoporosis, and glaucoma.  As appropriate for age/gender, discussed screening for colorectal cancer, prostate cancer, breast cancer, and cervical cancer. Checklist reviewing preventive services available has been given to the patient.    Reviewed patients plan of care and provided an AVS. The Basic Care Plan (routine screening as documented in Health Maintenance) for Helen meets the Care Plan requirement. This Care Plan has been established and reviewed with the Patient.      Joellen Christensen MD  [facility]    Identified Health Risks:

## 2023-03-29 LAB
B BURGDOR IGG+IGM SER QL: 0.04
DEPRECATED CALCIDIOL+CALCIFEROL SERPL-MC: 57 UG/L (ref 20–75)

## 2023-04-01 LAB — PYRIDOXAL PHOS SERPL-SCNC: 331.4 NMOL/L

## 2023-04-02 LAB — VIT B1 PYROPHOSHATE BLD-SCNC: 186 NMOL/L

## 2023-05-02 ENCOUNTER — OFFICE VISIT (OUTPATIENT)
Dept: AUDIOLOGY | Facility: CLINIC | Age: 83
End: 2023-05-02
Payer: COMMERCIAL

## 2023-05-02 DIAGNOSIS — H90.A31 MIXED CONDUCTIVE AND SENSORINEURAL HEARING LOSS OF RIGHT EAR WITH RESTRICTED HEARING OF LEFT EAR: Primary | ICD-10-CM

## 2023-05-02 PROCEDURE — V5011 HEARING AID FITTING/CHECKING: HCPCS | Mod: LT | Performed by: AUDIOLOGIST

## 2023-05-02 PROCEDURE — V5160 DISPENSING FEE BINAURAL: HCPCS | Performed by: AUDIOLOGIST

## 2023-05-02 PROCEDURE — V5020 CONFORMITY EVALUATION: HCPCS | Mod: LT | Performed by: AUDIOLOGIST

## 2023-05-02 PROCEDURE — V5011 HEARING AID FITTING/CHECKING: HCPCS | Mod: RT | Performed by: AUDIOLOGIST

## 2023-05-02 PROCEDURE — V5020 CONFORMITY EVALUATION: HCPCS | Mod: RT | Performed by: AUDIOLOGIST

## 2023-05-02 PROCEDURE — 92593 PR HEARING AID CHECK, BINAURAL: CPT | Performed by: AUDIOLOGIST

## 2023-05-02 PROCEDURE — V5261 HEARING AID, DIGIT, BIN, BTE: HCPCS | Mod: NU | Performed by: AUDIOLOGIST

## 2023-05-02 NOTE — PROGRESS NOTES
AUDIOLOGY REPORT    HEARING AID FITTING    SUBJECTIVE: Helen Graham, a 83 year old female, was seen in the Audiology Clinic at [facility] today for a Binaural hearing aid fitting. The patient was accompanied by her daughter, Debbie. 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  The patient was medically evaluated and determined to be cleared for binaural hearing aids by Dr. Gabriel Gomez      OBJECTIVE:  Prior to fitting, a hearing aid check was performed to ensure device functionality. The hearing aid conformity evaluation was completed.The hearing aids were placed and they provided a good fit. Real-ear-probe-microphone measurements were completed on the Testlio system and were a good match to NAL-NL1 target with soft sounds audible, moderate sounds comfortable, and loud sounds below discomfort. UCLs are verified through maximum power output measures and demonstrate appropriate limiting of loud inputs. Ms. Graham was oriented to proper hearing aid use, care, cleaning (no water, dry brush), rechargeable batteries, aid insertion/removal, user booklet, warranty information, storage cases, and other hearing aid details. The patient confirmed understanding of hearing aid use and care, and showed proper insertion of hearing aid while in the office today. Ms. Graham reported good volume and sound quality today. She is set to 90% of targets and denies loudness discomfort. I will show her how to change domes and wax traps at next visit. We will also connect her cell phone at next visit.     EAR(S) FIT: Binaural  MA HEARING AID MAKE: Right: Phonak Audeo P70-R; Left: Phonak Audeo P70-R    MA HEARING AID MODEL #: Right: [id number]; Left: [id number]  HEARING AID STYLE: Right: CASANDRA; Left: CASANDRA  DOME SIZE: Right: Cap dome; Left: Cap dome       LENGTH: Right:  1M; Left:  1M     SERIAL NUMBERS: Right: [id number]; Left: [id number]  WARRANTY END DATE: Right: 6/25/2026; Left:: 6/25/2026      The patient Does Not Require a signed a waiver that is on file agreeing to the upgrade charge, per their insurance contract.         ASSESSMENT: Binaural hearing aid fitting completed today. Verification measures were performed. The 45 day trial period was explained to patient, and they expressed understanding. Ms. Graham signed the Hearing Aid Purchase Agreement and was given a copy, as well as details on her hearing aids. Patient was counseled that exact out of pocket amounts cannot be determined for hearing aid claims being sent to insurance. Any insurance coverage information presented to the patient is an estimate only, and is not a guarantee of payment. Patient has been advised to check with their own insurance.    PLAN: Ms. Graham will return for follow-up on 5/23/2023 for a hearing aid review appointment. Please call this clinic with questions regarding today s appointment.    Janey Singleton, CCC-A  [place] Licensed Audiologist #[id number]

## 2023-05-02 NOTE — PATIENT INSTRUCTIONS

## 2023-05-23 ENCOUNTER — OFFICE VISIT (OUTPATIENT)
Dept: AUDIOLOGY | Facility: CLINIC | Age: 83
End: 2023-05-23
Payer: COMMERCIAL

## 2023-05-23 DIAGNOSIS — H90.A31 MIXED CONDUCTIVE AND SENSORINEURAL HEARING LOSS OF RIGHT EAR WITH RESTRICTED HEARING OF LEFT EAR: Primary | ICD-10-CM

## 2023-05-23 PROCEDURE — V5275 EAR IMPRESSION: HCPCS | Mod: RT | Performed by: AUDIOLOGIST

## 2023-05-24 NOTE — PROGRESS NOTES
"AUDIOLOGY REPORT    SUBJECTIVE:Helen Graham is a 83 year old female who was seen in the Audiology Clinic at the [facility] on 5/23/2023  for a follow-up check regarding the fitting of new hearing aids. 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  The patient was medically evaluated and determined to be cleared for binaural hearing aids by Dr. Gabriel Gomez.  The patient has been seen previously in this clinic and was fit with binaural Phonak Audeo P70-R CASANDRA hearing aids on 5/2/2023.  She is here with her daughter, Debbie, today. Helen reports having difficulty inserting hearing aids into her ears. She gets them in well sometimes, but will hear \"buzzing\" from the hearing aids. Patient has very small ear canals and is wearing a cap dome right now.      OBJECTIVE:     Based on patient report, it may be beneficial to fit her with custom slim tip ear molds with canal locks. Patient was on the fence if she wanted to try these, but we have decided together to order them and see what she thinks. In the meantime, she will continue to practice with the cap domes.     Otoscopy revealed mild wax in both ears. Ear molds were taken of both ears without incident today. Custom clear acrylic slim tip ear molds with canal locks will be ordered with size 1S receivers due to her small ears while still being in a good fitting range with the standard .    She prefers to have the volume 1 click lower and then doesn't hearing the \"buzzing\".  For now, she will just change the volume and we can make more fine tuning changes at her ear mold fitting appointment. She is fine with this plan.    We will also connect her cell phone at next visit. We did run out of time today due to appointment being incorrectly scheduled at 30 minutes instead of 60.    Reviewed 45 day trial period, care, " cleaning (no water, dry brush), rechargeable batteries, insertion/removal, toxicity, low-battery signal), aid insertion/removal, volume adjustment (if applicable), user booklet, warranty information, storage cases, and other hearing aid details.     Billed for 2 ear mold impressions today.    Extended her trial period to 7/25/2023.     ASSESSMENT: A follow-up appointment for hearing aid fitting was completed today. Changes to hearing aid was completed as outlined above.     PLAN:Helen will return for fitting of custom ear mold on June 15th at 3:30 pm. Please call this clinic with any questions regarding today s appointment.    Jb Singleton., CCC-A  [place] Licensed Audiologist #[id number]

## 2023-06-15 ENCOUNTER — OFFICE VISIT (OUTPATIENT)
Dept: AUDIOLOGY | Facility: CLINIC | Age: 83
End: 2023-06-15
Payer: COMMERCIAL

## 2023-06-15 DIAGNOSIS — H90.A31 MIXED CONDUCTIVE AND SENSORINEURAL HEARING LOSS OF RIGHT EAR WITH RESTRICTED HEARING OF LEFT EAR: Primary | ICD-10-CM

## 2023-06-15 PROCEDURE — V5264 EAR MOLD/INSERT: HCPCS | Mod: RT | Performed by: AUDIOLOGIST

## 2023-06-16 NOTE — PROGRESS NOTES
AUDIOLOGY REPORT    EARMOLD FITTING    SUBJECTIVE:Helen Graham is a 83 year old female who was seen in the Audiology Clinic at the [facility] on 6/15/2023  for an earmold fitting. 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  The patient has been seen previously in this clinic and was fit with binaural Phonak Audeo P70-R hearing aids on 5/2/2023.  Helen reports hearing feedback and difficulty getting her hearing aids in properly. She is being fit with custom cShell earmolds with canal locks today. She is here with her daughter, Debbie, today.    OBJECTIVE:     Otoscopy revealed clear ear canals, bilaterally.    Patient was fit with binaural custom cShell earmolds with canal locks today. Good physical fit. Patient was shown how to insert/remove earmolds and how to care for them. Patient did very well. Hearing aids were reprogrammed for cShell's and she is happy with the sound.     I connected her cell phone today and practiced streaming audio. She did not know her apple ID password so we couldn't download the raymond today. We will do this at her follow up appointment.     Billed for 2 custom earmolds today.    ASSESSMENT: Earmold fitting was completed today.     PLAN:Helen will return for follow-up on 7/7/2023 to check on fit of earmolds, settings of hearing aids, and to help with the raymond.  Please call this clinic with any questions regarding today s appointment.    Jb Singleton., CCC-A  [place] Licensed Audiologist #[id number]

## 2023-06-23 ENCOUNTER — ALLIED HEALTH/NURSE VISIT (OUTPATIENT)
Dept: FAMILY MEDICINE | Facility: CLINIC | Age: 83
End: 2023-06-23
Payer: COMMERCIAL

## 2023-06-23 DIAGNOSIS — M81.0 AGE-RELATED OSTEOPOROSIS WITHOUT CURRENT PATHOLOGICAL FRACTURE: Primary | ICD-10-CM

## 2023-06-23 PROCEDURE — 96372 THER/PROPH/DIAG INJ SC/IM: CPT | Mod: JZ | Performed by: INTERNAL MEDICINE

## 2023-06-23 PROCEDURE — 99207 PR NO CHARGE NURSE ONLY: CPT

## 2023-06-30 ENCOUNTER — PATIENT OUTREACH (OUTPATIENT)
Dept: GERIATRIC MEDICINE | Facility: CLINIC | Age: 83
End: 2023-06-30
Payer: COMMERCIAL

## 2023-07-07 ENCOUNTER — OFFICE VISIT (OUTPATIENT)
Dept: AUDIOLOGY | Facility: CLINIC | Age: 83
End: 2023-07-07
Payer: COMMERCIAL

## 2023-07-07 DIAGNOSIS — H90.A31 MIXED CONDUCTIVE AND SENSORINEURAL HEARING LOSS OF RIGHT EAR WITH RESTRICTED HEARING OF LEFT EAR: Primary | ICD-10-CM

## 2023-07-07 PROCEDURE — V5010 ASSESSMENT FOR HEARING AID: HCPCS | Performed by: AUDIOLOGIST

## 2023-07-07 NOTE — PROGRESS NOTES
AUDIOLOGY REPORT    HEARING AID CHECK    SUBJECTIVE:Helen Graham is a 83 year old female who was seen in the Audiology Clinic at the [facility] on 7/7/2023  for a hearing aid check. 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  The patient has been seen previously in this clinic and was fit with binaural Phonak Audeo P70-R hearing aids on 5/2/2023. She was fit with binaural cShell earmolds on 6/15/2023.  Helen reports the earmolds are fitting well and are comfortable. She is having no issues with insertion/removal. She was having a headache for the first few days after getting the earmolds, but this has resolved. She reports doing well with the current settings of the hearing aids. She is happy overall.    OBJECTIVE:     Otoscopy revealed clear ear canals, bilaterally.    Based on patient report, the following changes were made:    1. Showed patient how to change wax traps and she demonstrated this independently today. She was given extra wax traps.   2. Reminded her how and when to clean her hearing aids.  3. Took up some of the noise management systems in the hearing aids today based on her report of struggling to hear when in background noise. Also counseled her on wearing the hearing aids more, even if at home alone.  4. Downloaded the raymond and taught her how to use it. She did well.    No charge visit today (in warranty hearing aid check).    ASSESSMENT: Hearing aid check was completed today. Changes to hearing aid was completed as outlined above.     PLAN:Helen will return for follow-up as needed, or at least every 6-9 months for cleaning and assessment of hearing aid.   Please call this clinic with any questions regarding today s appointment.    Jb Singleton., CCC-A  [place] Licensed Audiologist #[id number]

## 2023-07-20 ASSESSMENT — ACTIVITIES OF DAILY LIVING (ADL): DEPENDENT_IADLS:: INDEPENDENT

## 2023-07-20 NOTE — PROGRESS NOTES
[facility] Care Coordination Contact    [facility] Home Visit Assessment     Home visit for Health Risk Assessment with Helen Graham completed on 06/29/2023    Type of residence:: Private home - stairs  Current living arrangement:: I live in a private home with family     Assessment completed with:: Patient, Children    Current Care Plan  Member currently receiving the following home care services:     Member currently receiving the following community resources: None      Medication Review  Medication reconciliation completed in Epic: Yes  Medication set-up & administration: Independent and sets up on own daily.  Self-administers medications.  Medication Risk Assessment Medication (1 or more, place referral to MTM): N/A: No risk factors identified  MTM Referral Placed: No: No risk factors idenified    Mental/Behavioral Health   Depression Screening:           Mental health DX:: No        Falls Assessment:   Fallen 2 or more times in the past year?: No   Any fall with injury in the past year?: No    ADL/IADL Dependencies:   Dependent ADLs:: Independent  Dependent IADLs:: Independent    [facility] sponsored benefits: Shared information re: Silver Sneakers/gym memberships, ASA, Calcium +D.    PCA Assessment completed at visit: Not Applicable     Elderly Waiver Eligibility: No-does not meet criteria    Care Plan & Recommendations: Helen continues to live with her family and remains independent. She has no new needs.     See LTCC for detailed assessment information.    Follow-Up Plan: Member informed of future contact, plan to f/u with member with a 6 month telephone assessment.  Contact information shared with member and family, encouraged member to call with any questions or concerns at any time.    [facility] care continuum providers: Please see Snapshot and Care Management Flowsheets for Specific details of care plan.    This CC note routed to PCP, Joellen Christensen.    Hortencia Davis,  ADELAIDE  [facility]  Phone: [phone]

## 2023-07-21 ENCOUNTER — PATIENT OUTREACH (OUTPATIENT)
Dept: GERIATRIC MEDICINE | Facility: CLINIC | Age: 83
End: 2023-07-21
Payer: COMMERCIAL

## 2023-07-21 NOTE — PROGRESS NOTES
[facility] Care Coordination Contact    Received after visit chart from care coordinator.  Completed following tasks: Mailed copy of care plan to client, Mailed Safe Medication Disposal  and Mailed [employer] Leave Behind Letter     Ryan Hurley  [facility]  Case Management Specialist  [phone]

## 2023-07-21 NOTE — LETTER
July 21, 2023    Important [employer] Information    DARREL BOWERS  [address] [address]  Your Care Plan  Dear Darrel,  When we spoke recently, I promised to send you a Care Plan. The plan enclosed is a summary of our discussion. It includes the steps we agreed would help you meet your health goals. In addition, I can help you with:  [id number]  This program is available to members who need a ride to medical and dental visits. To schedule a ride, call [phone] or [phone] (toll free). TTY: 711. You can call Monday - Thursday 8 a.m. to 5 p.m. and Fridays 9 a.m. to 5 p.m.  One Pass  One Pass is your no-cost, complete, fitness solution for your mind and body. To learn more visit [employer]/fitness or call One Pass, toll-free [phone] (TTY: 711) 8 a.m. to 9 p.m. Monday-Friday.  Health Care Directive   This form helps you outline your health care wishes. You can request a form from me and I will answer any questions you have before you discuss it with your doctor.   Annual Physical  Take a key step on your path to good health and set up an annual physical at your clinic.  Questions?  Call me at [phone] Monday-Friday between 8am and 5pm.  TTY: 711. As we discussed, I plan to be in touch with you again in 6 months to follow up via phone.  Sincerely,    ADELAIDE Maguire  [phone]  [email or web address]    cc: member records

## 2023-11-11 ENCOUNTER — IMMUNIZATION (OUTPATIENT)
Dept: FAMILY MEDICINE | Facility: CLINIC | Age: 83
End: 2023-11-11
Payer: COMMERCIAL

## 2023-11-11 PROCEDURE — 90662 IIV NO PRSV INCREASED AG IM: CPT

## 2023-11-11 PROCEDURE — 90480 ADMN SARSCOV2 VAC 1/ONLY CMP: CPT

## 2023-11-11 PROCEDURE — 91320 SARSCV2 VAC 30MCG TRS-SUC IM: CPT

## 2023-11-11 PROCEDURE — G0008 ADMIN INFLUENZA VIRUS VAC: HCPCS

## 2023-11-30 DIAGNOSIS — M81.0 AGE-RELATED OSTEOPOROSIS WITHOUT CURRENT PATHOLOGICAL FRACTURE: Primary | ICD-10-CM

## 2023-11-30 DIAGNOSIS — Z92.29 PERSONAL HISTORY OF OTHER DRUG THERAPY: ICD-10-CM

## 2023-12-26 ENCOUNTER — OFFICE VISIT (OUTPATIENT)
Dept: INTERNAL MEDICINE | Facility: CLINIC | Age: 83
End: 2023-12-26
Payer: COMMERCIAL

## 2023-12-26 ENCOUNTER — HOSPITAL ENCOUNTER (OUTPATIENT)
Dept: ULTRASOUND IMAGING | Facility: CLINIC | Age: 83
Discharge: HOME | End: 2023-12-26
Attending: INTERNAL MEDICINE | Admitting: INTERNAL MEDICINE
Payer: COMMERCIAL

## 2023-12-26 VITALS
RESPIRATION RATE: 16 BRPM | WEIGHT: 103.9 LBS | DIASTOLIC BLOOD PRESSURE: 80 MMHG | OXYGEN SATURATION: 98 % | SYSTOLIC BLOOD PRESSURE: 138 MMHG | HEART RATE: 73 BPM | BODY MASS INDEX: 20.99 KG/M2

## 2023-12-26 DIAGNOSIS — M79.662 PAIN OF LEFT LOWER LEG: ICD-10-CM

## 2023-12-26 DIAGNOSIS — K21.00 GASTROESOPHAGEAL REFLUX DISEASE WITH ESOPHAGITIS WITHOUT HEMORRHAGE: ICD-10-CM

## 2023-12-26 DIAGNOSIS — M81.0 AGE-RELATED OSTEOPOROSIS WITHOUT CURRENT PATHOLOGICAL FRACTURE: Primary | ICD-10-CM

## 2023-12-26 PROCEDURE — 99214 OFFICE O/P EST MOD 30 MIN: CPT | Mod: 25 | Performed by: INTERNAL MEDICINE

## 2023-12-26 PROCEDURE — 96372 THER/PROPH/DIAG INJ SC/IM: CPT | Performed by: INTERNAL MEDICINE

## 2023-12-26 PROCEDURE — 93971 EXTREMITY STUDY: CPT | Mod: LT

## 2023-12-26 NOTE — PROGRESS NOTES
(M81.0) Age-related osteoporosis without current pathological fracture  (primary encounter diagnosis)  Comment: Forteo [phone].  On Prolia since 2021, tolerating it well. Due for does # 5.  DXA 8/2022:  Lumbar Spine: L1-L2: BMD: 0.547 g/cm2. T-score: -5.1. Z-score: -3.3  RIGHT Hip Total: BMD: 0.697 g/cm2. T-score: -2.5. Z-score: -0.4  RIGHT Hip Femoral neck: BMD: 0.656 g/cm2. T-score: -2.7. Z-score: -0.6  LEFT Hip Total: BMD: 0.680 g/cm2. T-score: -2.6. Z-score: -0.6  LEFT Hip Femoral neck: BMD: 0.681 g/cm2. T-score: -2.6. Z-score: -0.4  COMPARISON: There has been a 4.2% increase in lumbar spine BMD. There has been a 9.5% increase in bilateral hip BMD.     Component      Latest Ref Rng 3/28/2023  5:46 PM   Sodium      136 - 145 mmol/L 141    Potassium      3.4 - 5.3 mmol/L 4.3    Chloride      98 - 107 mmol/L 104    Carbon Dioxide (CO2)      22 - 29 mmol/L 27    Anion Gap      7 - 15 mmol/L 10    Urea Nitrogen      8.0 - 23.0 mg/dL 19.1    Creatinine      0.51 - 0.95 mg/dL 0.77    Calcium      8.8 - 10.2 mg/dL 9.7    Glucose      70 - 99 mg/dL 122 (H)    Alkaline Phosphatase      35 - 104 U/L 84    AST      10 - 35 U/L 25    ALT      10 - 35 U/L 18    Protein Total      6.4 - 8.3 g/dL 7.8    Albumin      3.5 - 5.2 g/dL 4.2    Bilirubin Total      <=1.2 mg/dL 0.2    GFR Estimate      >60 mL/min/1.73m2 77    Vitamin D Deficiency screening      20 - 75 ug/L 57           Plan: DX Hip/Pelvis/Spine            (M79.662) Pain of left lower leg  Comment: pain in the left lower leg, posterior mid calf, was severe after the trip to [facility] a month ago. It was very painful for the 10 days after the flight, now pain is better, but still present.  On the exam, positive Homman's sign and mild swelling in the left lower leg.  US left lower leg will be done tonight to rule out DVT.  She never had DVT before.  Plan: US Lower Extremity Venous Duplex Left            (K21.00) Gastroesophageal reflux disease with esophagitis without  hemorrhage  Comment: stable on omeprazole      Patient was educated on safety of Prolia utilizing Patient Counseling Chart for Healthcare Providers, as outlined by the Prolia REMS progam.     Return in about 6 months (around 6/26/2024) for AWV, Prolia.    Patient Instructions   Prolia 5th today.  Prolia 6th in 6 months with AWV.    DXA in 8/2024 .   Phone number to schedule [phone].    Daily calcium need is 7581-8830 mg a day from the diet and supplements.  Calcium citrate is easier to digest.  Vitamin D 2000 IU daily recommended.    Risk of rebound vertebral fractures is higher when Prolia suddenly stopped or dose was missed.      Prolia and Covid vaccine should be  for at least a week.       US leg to rule out DVT.          /80   Pulse 73   Resp 16   Wt 47.1 kg (103 lb 14.4 oz)   LMP  (LMP Unknown)   SpO2 98%   BMI 20.99 kg/m        Did you experience any problems with previous Prolia injection? no  Any medication change in the last 6 months? no  Did you take prednisone or other immunosupressant drugs in the last 6 months   (chemo, transplant, rheum, dermatology conditions)? no  Did you have any serious infection in the last 6 months?no  Any recent hospitalizations?no  Do you plan any dental work in the next 2-3 months?no  How much calcium do you take daily from the diet and supplements?1200 mg  How much vit D do you take daily? 2000 IU  Last DXA? 8/2022, Reviewed and discussed      Patient is here today for the Prolia injection. Patient tolerated previous injections well.   We discussed calcium and vit D daily needs today.       We discussed high risk of rebound vertebral fractures when Prolia suddenly stopped.    Next Prolia injection will be in 6 months.           This note has been dictated using voice recognition software. Any grammatical or context distortions are unintentional and inherent to the software      Patient Active Problem List   Diagnosis    Chronic Reflux Esophagitis     Malabsorption Syndrome    Osteoporosis    Neuropathy       Current Outpatient Medications   Medication    calcium cit-mag-D3-Zn--eva 808--3.75 mg-mg-unit-mg Tab    cholecalciferol, vitamin D3, (VITAMIN D3) 2,000 unit Tab    omeprazole (PRILOSEC) 20 MG DR capsule     Current Facility-Administered Medications   Medication    denosumab (PROLIA) injection 60 mg    denosumab (PROLIA) injection 60 mg

## 2023-12-26 NOTE — PATIENT INSTRUCTIONS
Prolia 5th today.  Prolia 6th in 6 months with AWV.    DXA in 8/2024 .   Phone number to schedule [phone].    Daily calcium need is 1457-3248 mg a day from the diet and supplements.  Calcium citrate is easier to digest.  Vitamin D 2000 IU daily recommended.    Risk of rebound vertebral fractures is higher when Prolia suddenly stopped or dose was missed.      Prolia and Covid vaccine should be  for at least a week.       US leg to rule out DVT.

## 2023-12-28 ENCOUNTER — PATIENT OUTREACH (OUTPATIENT)
Dept: GERIATRIC MEDICINE | Facility: CLINIC | Age: 83
End: 2023-12-28
Payer: COMMERCIAL

## 2024-01-09 NOTE — PROGRESS NOTES
[facility] Care Coordination Contact      [facility] Six-Month Telephone Assessment    6 month telephone assessment completed on 12/28/23.    ER visits: No  Hospitalizations: No  TCU stays: No  Significant health status changes: na  Falls/Injuries: No  ADL/IADL changes: No  Changes in services: No    Caregiver Assessment follow up:  na    Goals: See POC in chart for goal progress documentation.  Care Coordinator spoke to Daughter Tanna.  She said that her mom is doing good.  She is having more knee pain.   She is active with her grief group through hospice still.   Tanna has noticed that her memory is shorter; she will continue to watch it.   She has no needs at this time.     Will see member in 6 months for an annual health risk assessment.   Encouraged member to call CC with any questions or concerns in the meantime.     ADELAIDE Maguire  [facility]  Phone: [phone]

## 2024-01-12 ENCOUNTER — OFFICE VISIT (OUTPATIENT)
Dept: AUDIOLOGY | Facility: CLINIC | Age: 84
End: 2024-01-12
Payer: COMMERCIAL

## 2024-01-12 DIAGNOSIS — H90.A31 MIXED CONDUCTIVE AND SENSORINEURAL HEARING LOSS OF RIGHT EAR WITH RESTRICTED HEARING OF LEFT EAR: Primary | ICD-10-CM

## 2024-01-12 PROCEDURE — V5299 HEARING SERVICE: HCPCS | Performed by: AUDIOLOGIST

## 2024-01-12 NOTE — PROGRESS NOTES
AUDIOLOGY REPORT    HEARING AID CHECK    SUBJECTIVE:Helen Graham is a 83 year old female who was seen in the Audiology Clinic at the [facility] on 1/12/2024  for a hearing aid check. The patient has been seen previously in this clinic and was fit with binaural Phonak Audeo P70-R hearing aids on 5/2/2023.  Helen reports hearing feedback and difficulty getting her hearing aids in properly. She is being fit with custom cShell earmolds with canal locks today. She is here with her daughter, Julian, today.     OBJECTIVE:     Otoscopy revealed occluding cerumen bilaterally.  She will return on 2/7/2024 for ear cleaning and updated audiogram.    Based on patient report, the following changes were made:    Hearing aids and cShells were cleaned. The left wax trap was plugged. Reviewed how to change the wax traps with patient today and she did well with this. Gave extra supplies.  Reviewed how to use the raymond.  No programming changes are made as patient is happy with the settings.      ASSESSMENT: Hearing aid check was completed today. Changes to hearing aid was completed as outlined above.     PLAN:Helen will return for follow-up as needed, or at least every 6-9 months for cleaning and assessment of hearing aid.   Please call this clinic with any questions regarding today s appointment.    Janey Singleton, CCC-A  [place] Licensed Audiologist #[id number]

## 2024-02-07 ENCOUNTER — OFFICE VISIT (OUTPATIENT)
Dept: OTOLARYNGOLOGY | Facility: CLINIC | Age: 84
End: 2024-02-07
Payer: COMMERCIAL

## 2024-02-07 ENCOUNTER — OFFICE VISIT (OUTPATIENT)
Dept: AUDIOLOGY | Facility: CLINIC | Age: 84
End: 2024-02-07
Payer: COMMERCIAL

## 2024-02-07 DIAGNOSIS — H90.3 SNHL (SENSORY-NEURAL HEARING LOSS), ASYMMETRICAL: Primary | ICD-10-CM

## 2024-02-07 DIAGNOSIS — H90.5 SENSORINEURAL HEARING LOSS (SNHL), UNSPECIFIED LATERALITY: Primary | ICD-10-CM

## 2024-02-07 DIAGNOSIS — H90.3 SENSORINEURAL HEARING LOSS (SNHL) OF BOTH EARS: ICD-10-CM

## 2024-02-07 DIAGNOSIS — Z97.4 WEARS HEARING AID IN BOTH EARS: ICD-10-CM

## 2024-02-07 DIAGNOSIS — H90.5 SENSORINEURAL HEARING LOSS (SNHL), UNSPECIFIED LATERALITY: ICD-10-CM

## 2024-02-07 DIAGNOSIS — H61.23 BILATERAL IMPACTED CERUMEN: Primary | ICD-10-CM

## 2024-02-07 PROCEDURE — 92557 COMPREHENSIVE HEARING TEST: CPT | Performed by: AUDIOLOGIST

## 2024-02-07 PROCEDURE — G0268 REMOVAL OF IMPACTED WAX MD: HCPCS | Performed by: OTOLARYNGOLOGY

## 2024-02-07 PROCEDURE — 92567 TYMPANOMETRY: CPT | Performed by: AUDIOLOGIST

## 2024-02-07 NOTE — LETTER
2/7/2024         RE: Helen Graham  [address] [address]        Dear Colleague,    Thank you for referring your patient, Helen Graham, to the [facility]. Please see a copy of my visit note below.    CHIEF COMPLAINT:  Patient presents with:  Cerumen Impaction         HISTORY OF PRESENT ILLNESS    Helen was seen in follow up after previous visit for ear cleaning. No change in hearing.  No ear pain or discharge.  She has hearing aids through our clinic but does not wear them consistently.         REVIEW OF SYSTEMS    Review of Systems: a 10-system review is reviewed at this encounter.  See scanned document.       Allergies   Allergen Reactions     Other Drug Allergy (See Comments) Unknown     Stomatil           PHYSICAL EXAM:        HEAD: Normal appearance and symmetry:  No cutaneous lesions.      EARS:        RIGHT: ++ cerumen   LEFT:   ++ cerumen     CERUMEN IMPACTION REMOVAL    After obtaining verbal consent, and using the binocular microscope.  Cerumen impaction(s) were removed from the affected ear canal(s)  using a wire loops and/or suction.  The patient tolerated the procedure well without incident.    NOSE:    Dorsum:   straight       ORAL CAVITY/OROPHARYNX:    Lips:  Normal.     NECK:  Trachea:  midline     NEURO:   Alert and Oriented    GAIT AND STATION:  normal     RESPIRATORY:   Symmetry and Respiratory effort    PSYCH:   normal mood and affect    SKIN:  warm and dry         IMPRESSION:   Encounter Diagnoses   Name Primary?     Bilateral impacted cerumen Yes     Wears hearing aid in both ears      Sensorineural hearing loss (SNHL) of both ears             RECOMMENDATIONS:    Orders Placed This Encounter   Procedures     Remove Cerumen      No orders of the defined types were placed in this encounter.    Return 6 months for cleaning.       Again, thank you for allowing me to participate in the care of your patient.        Sincerely,        Gabriel Gomez MD

## 2024-02-07 NOTE — PROGRESS NOTES
AUDIOLOGY REPORT    SUMMARY: Audiology visit completed. See audiogram for results.      RECOMMENDATIONS: Follow-up with ENT.    Janey Walsh, CCC-A  [place] Licensed Audiologist #[id number]

## 2024-02-07 NOTE — PROGRESS NOTES
CHIEF COMPLAINT:  Patient presents with:  Cerumen Impaction         HISTORY OF PRESENT ILLNESS    Helen was seen in follow up after previous visit for ear cleaning. No change in hearing.  No ear pain or discharge.  She has hearing aids through our clinic but does not wear them consistently.         REVIEW OF SYSTEMS    Review of Systems: a 10-system review is reviewed at this encounter.  See scanned document.       Allergies   Allergen Reactions    Other Drug Allergy (See Comments) Unknown     Stomatil           PHYSICAL EXAM:        HEAD: Normal appearance and symmetry:  No cutaneous lesions.      EARS:        RIGHT: ++ cerumen   LEFT:   ++ cerumen     CERUMEN IMPACTION REMOVAL    After obtaining verbal consent, and using the binocular microscope.  Cerumen impaction(s) were removed from the affected ear canal(s)  using a wire loops and/or suction.  The patient tolerated the procedure well without incident.    NOSE:    Dorsum:   straight       ORAL CAVITY/OROPHARYNX:    Lips:  Normal.     NECK:  Trachea:  midline     NEURO:   Alert and Oriented    GAIT AND STATION:  normal     RESPIRATORY:   Symmetry and Respiratory effort    PSYCH:   normal mood and affect    SKIN:  warm and dry         IMPRESSION:   Encounter Diagnoses   Name Primary?    Bilateral impacted cerumen Yes    Wears hearing aid in both ears     Sensorineural hearing loss (SNHL) of both ears             RECOMMENDATIONS:    Orders Placed This Encounter   Procedures    Remove Cerumen      No orders of the defined types were placed in this encounter.    Return 6 months for cleaning.

## 2024-05-09 ENCOUNTER — PATIENT OUTREACH (OUTPATIENT)
Dept: GERIATRIC MEDICINE | Facility: CLINIC | Age: 84
End: 2024-05-09
Payer: COMMERCIAL

## 2024-05-11 ENCOUNTER — HEALTH MAINTENANCE LETTER (OUTPATIENT)
Age: 84
End: 2024-05-11

## 2024-05-14 ENCOUNTER — PATIENT OUTREACH (OUTPATIENT)
Dept: GERIATRIC MEDICINE | Facility: CLINIC | Age: 84
End: 2024-05-14
Payer: COMMERCIAL

## 2024-05-22 ENCOUNTER — PATIENT OUTREACH (OUTPATIENT)
Dept: GERIATRIC MEDICINE | Facility: CLINIC | Age: 84
End: 2024-05-22
Payer: COMMERCIAL

## 2024-05-22 NOTE — LETTER
May 22, 2024    Important [employer] Information    DARREL BOWERS  [address] [address]  I've Tried to Contact You  Dear Darrel,  My name is  ADELAIDE Maguire, and I am your Care Coordinator. I have been trying to contact you, but have not been able to reach you.  As a Care Coordinator, I am here to help. My role is to make sure that your health plan is working for you. I am available to:  Review your health care needs with you over the phone or in-person   Provide support for and information about covered services or supplies to help keep you safe and healthy in your home  Answer questions about your insurance   Help you find a provider, such as a doctor or dentist, to meet your unique needs  I can also help you schedule a free physical at your clinic. To schedule an appointment, please call me at [phone] Monday-Friday between 8am-5pm TTY: [phone].  I have included a copy of a Member Engagement Questionnaire. Please fill it out and return it in the included envelope I have also included a document that provides you with more information about my role as your Care Coordinator and how I can help with your medical, social and everyday needs.     Questions?  Please call me at the phone number listed above. If you d like, a friend or family member may call for you.  For general questions, call:   [employer] Member Services at [phone] between the hours of 8 a.m. to 9 p.m. Central, seven days a week. TTY: [phone].  Please note that access to a representative may be limited during certain times of the year.    Sincerely,    ADELAIDE Maguire  [phone]  [email or web address]      cc: member records                                                                      [employer]  and [employer] Access[employer]ility Solution  Enhanced are HMO D-SNPs that contract with both Medicare and the [place] Medical Assistance (Medicaid) program to provide benefits of both programs to enrollees. Enrollment in  [employer] and [employer] Access[employer] Enhanced depends on contract renewal.      2023 [employer].

## 2024-05-22 NOTE — PROGRESS NOTES
[facility] Care Coordination Contact    Care Coordinator left message for Hector Cisse for mom Helen to call Care Coordinator back.  She is due for annual visit or phone call.     ADELAIDE Maguire  [facility]  Phone: [phone]

## 2024-05-22 NOTE — PROGRESS NOTES
"[facility] Care Coordination Contact    Per CC, mailed client an \"Unable to Contact\" letter.  Mailed member [employer] Member Engagement Questionnaire with self-addressed return envelope & supporting documents as required.       Christine Sheridan  Care Management Specialist   [facility]   [phone]    "

## 2024-05-22 NOTE — PROGRESS NOTES
[facility] Contact    Completed 4 attempts to reach client with no response.  Member is officially unable to contact effective today.  Attempts made - 05/09 - VM, 05/14 - Email/, 05/22 - Email/VM/UTR letter sent  Completed MMIS entry.  Completed health plan required [facility] POC.    Follow-up Plan: CC will attempt to reach member in six months.    This CC note routed to PCP, Joellen Christensen.    Care Coordinator tasked cms to mail UTR letter.     [facility]  Phone: [phone]

## 2024-05-22 NOTE — PROGRESS NOTES
[facility] Care Coordination Contact    Care Coordinator sent email to Tanna cain of Helen asking for a call back.  Helen is due for an annual visit or call.       ADEALIDE Maguire  [facility]  Phone: [phone]

## 2024-07-11 ENCOUNTER — PATIENT OUTREACH (OUTPATIENT)
Dept: GERIATRIC MEDICINE | Facility: CLINIC | Age: 84
End: 2024-07-11
Payer: COMMERCIAL

## 2024-07-11 NOTE — PROGRESS NOTES
[facility] Care Coordination Contact    Called member's daughter (Tanna) and left a voice mail to remind member to call for this CHW if member requires assistance with their medical assistance renewal. In the message notified member that the paperwork is due by (08/01/2024). Provided this CHW contact information.     ASHLEIGH Alvarado  [facility]  [phone]

## 2024-08-09 ENCOUNTER — OFFICE VISIT (OUTPATIENT)
Dept: OTOLARYNGOLOGY | Facility: CLINIC | Age: 84
End: 2024-08-09
Payer: COMMERCIAL

## 2024-08-09 DIAGNOSIS — Z97.4 WEARS HEARING AID IN BOTH EARS: ICD-10-CM

## 2024-08-09 DIAGNOSIS — H61.23 BILATERAL IMPACTED CERUMEN: Primary | ICD-10-CM

## 2024-08-09 DIAGNOSIS — H90.5 SENSORINEURAL HEARING LOSS (SNHL), UNSPECIFIED LATERALITY: ICD-10-CM

## 2024-08-09 PROCEDURE — 69210 REMOVE IMPACTED EAR WAX UNI: CPT | Performed by: OTOLARYNGOLOGY

## 2024-08-09 NOTE — LETTER
8/9/2024      Helen Graham  [address] [address]      Dear Colleague,    Thank you for referring your patient, Helen Graham, to the [facility]. Please see a copy of my visit note below.    FOLLOW UP VISIT NOTE    Patient presents with:  Cerumen Impaction: 6 mo ear cleaning, Audio 02/2024. Bilateral hearing aids.       HISTORY OF PRESENT ILLNESS    Helen was seen in follow up after previous 2/7/2024 visit for ear cleaning.   Last auidogram 6 months ago.  No new ENT concerns.  No ear pain or discharge.         REVIEW OF SYSTEMS    Review of Systems: a 10-system review is reviewed at this encounter.  See scanned document.       Allergies   Allergen Reactions     Other Drug Allergy (See Comments) Unknown     Stomatil           PHYSICAL EXAM:        HEAD: Normal appearance and symmetry:  No cutaneous lesions.      EARS:        RIGHT: inspissated cerumen in EAC   LEFT:   same     CERUMEN IMPACTION REMOVAL    After obtaining verbal consent, and using the binocular microscope.  Cerumen impaction(s) were removed from the affected ear canal(s)  using a wire loops and/or suction.  The patient tolerated the procedure well without incident.    NOSE:    Dorsum:   straight       ORAL CAVITY/OROPHARYNX:    Lips:  Normal.     NECK:  Trachea:  midline     NEURO:   Alert and Oriented    GAIT AND STATION:  normal     RESPIRATORY:   Symmetry and Respiratory effort    PSYCH:   normal mood and affect    SKIN:  warm and dry         IMPRESSION:   Encounter Diagnoses   Name Primary?     Bilateral impacted cerumen Yes     Wears hearing aid in both ears      Sensorineural hearing loss (SNHL), unspecified laterality             RECOMMENDATIONS:    Orders Placed This Encounter   Procedures     Remove Cerumen      Return 6 months for cleaning/audiogram      Again, thank you for allowing me to participate in the care of your patient.        Sincerely,        Gabriel Gomez MD

## 2024-08-09 NOTE — PROGRESS NOTES
FOLLOW UP VISIT NOTE    Patient presents with:  Cerumen Impaction: 6 mo ear cleaning, Audio 02/2024. Bilateral hearing aids.       HISTORY OF PRESENT ILLNESS    Helen was seen in follow up after previous 2/7/2024 visit for ear cleaning.   Last auidogram 6 months ago.  No new ENT concerns.  No ear pain or discharge.         REVIEW OF SYSTEMS    Review of Systems: a 10-system review is reviewed at this encounter.  See scanned document.       Allergies   Allergen Reactions    Other Drug Allergy (See Comments) Unknown     Stomatil           PHYSICAL EXAM:        HEAD: Normal appearance and symmetry:  No cutaneous lesions.      EARS:        RIGHT: inspissated cerumen in EAC   LEFT:   same     CERUMEN IMPACTION REMOVAL    After obtaining verbal consent, and using the binocular microscope.  Cerumen impaction(s) were removed from the affected ear canal(s)  using a wire loops and/or suction.  The patient tolerated the procedure well without incident.    NOSE:    Dorsum:   straight       ORAL CAVITY/OROPHARYNX:    Lips:  Normal.     NECK:  Trachea:  midline     NEURO:   Alert and Oriented    GAIT AND STATION:  normal     RESPIRATORY:   Symmetry and Respiratory effort    PSYCH:   normal mood and affect    SKIN:  warm and dry         IMPRESSION:   Encounter Diagnoses   Name Primary?    Bilateral impacted cerumen Yes    Wears hearing aid in both ears     Sensorineural hearing loss (SNHL), unspecified laterality             RECOMMENDATIONS:    Orders Placed This Encounter   Procedures    Remove Cerumen      Return 6 months for cleaning/audiogram

## 2024-09-11 ENCOUNTER — TELEPHONE (OUTPATIENT)
Dept: INTERNAL MEDICINE | Facility: CLINIC | Age: 84
End: 2024-09-11
Payer: COMMERCIAL

## 2024-09-11 DIAGNOSIS — M81.0 AGE-RELATED OSTEOPOROSIS WITHOUT CURRENT PATHOLOGICAL FRACTURE: Primary | ICD-10-CM

## 2024-09-11 DIAGNOSIS — Z92.29 PERSONAL HISTORY OF OTHER DRUG THERAPY: ICD-10-CM

## 2024-09-11 NOTE — TELEPHONE ENCOUNTER
Last prolia 12/26/23. Appointment scheduled today for Friday. We usually need 2 weeks to make sure that medication is authorized prior to receiving the injection.     Not currently authorized. Will need to send a new order to Dr. Christensen to sign.     Routing to provider to sign order.     Patient will need to have the visit with Dr. Christensen to get Prolia injection- no currently labs in our chart since 3/28/23 and RN's are unable to give injection.      Has a visit with Dr. Christensen on 10/7/24 and can get injection at that visit.     Xiomara BOB RN

## 2024-09-12 NOTE — TELEPHONE ENCOUNTER
[facility]    Outgoing call to daughter Tanna, no answer. Unable to leave .

## 2024-09-12 NOTE — TELEPHONE ENCOUNTER
Can we attempt to call again today, we will not be able to do injection tomorrow due to needed auth and needing to get labs.     She will need to wait until provider visit to get Prolia.     .  Xiomara BOB RN

## 2024-09-24 ENCOUNTER — PATIENT OUTREACH (OUTPATIENT)
Dept: CARE COORDINATION | Facility: CLINIC | Age: 84
End: 2024-09-24
Payer: COMMERCIAL

## 2024-10-07 ENCOUNTER — OFFICE VISIT (OUTPATIENT)
Dept: INTERNAL MEDICINE | Facility: CLINIC | Age: 84
End: 2024-10-07
Payer: COMMERCIAL

## 2024-10-07 VITALS
RESPIRATION RATE: 16 BRPM | BODY MASS INDEX: 20.04 KG/M2 | SYSTOLIC BLOOD PRESSURE: 148 MMHG | OXYGEN SATURATION: 97 % | WEIGHT: 99.38 LBS | HEIGHT: 59 IN | HEART RATE: 68 BPM | DIASTOLIC BLOOD PRESSURE: 70 MMHG | TEMPERATURE: 98 F

## 2024-10-07 DIAGNOSIS — Z13.89 SCREENING FOR BLOOD OR PROTEIN IN URINE: ICD-10-CM

## 2024-10-07 DIAGNOSIS — K21.00 GASTROESOPHAGEAL REFLUX DISEASE WITH ESOPHAGITIS WITHOUT HEMORRHAGE: ICD-10-CM

## 2024-10-07 DIAGNOSIS — M81.0 AGE-RELATED OSTEOPOROSIS WITHOUT CURRENT PATHOLOGICAL FRACTURE: ICD-10-CM

## 2024-10-07 DIAGNOSIS — R26.89 BALANCE PROBLEMS: ICD-10-CM

## 2024-10-07 DIAGNOSIS — Z00.00 ENCOUNTER FOR ANNUAL WELLNESS VISIT (AWV) IN MEDICARE PATIENT: Primary | ICD-10-CM

## 2024-10-07 DIAGNOSIS — Z13.29 SCREENING FOR ENDOCRINE, METABOLIC AND IMMUNITY DISORDER: ICD-10-CM

## 2024-10-07 DIAGNOSIS — N63.21 MASS OF UPPER OUTER QUADRANT OF LEFT BREAST: ICD-10-CM

## 2024-10-07 DIAGNOSIS — Z13.228 SCREENING FOR ENDOCRINE, METABOLIC AND IMMUNITY DISORDER: ICD-10-CM

## 2024-10-07 DIAGNOSIS — R93.89 ABNORMAL FINDINGS ON DIAGNOSTIC IMAGING OF OTHER SPECIFIED BODY STRUCTURES: ICD-10-CM

## 2024-10-07 DIAGNOSIS — M62.81 GENERALIZED MUSCLE WEAKNESS: ICD-10-CM

## 2024-10-07 DIAGNOSIS — R73.03 PREDIABETES: ICD-10-CM

## 2024-10-07 DIAGNOSIS — Z13.0 SCREENING FOR ENDOCRINE, METABOLIC AND IMMUNITY DISORDER: ICD-10-CM

## 2024-10-07 LAB
ALBUMIN UR-MCNC: NEGATIVE MG/DL
APPEARANCE UR: CLEAR
BILIRUB UR QL STRIP: NEGATIVE
COLOR UR AUTO: YELLOW
ERYTHROCYTE [DISTWIDTH] IN BLOOD BY AUTOMATED COUNT: 13.2 % (ref 10–15)
EST. AVERAGE GLUCOSE BLD GHB EST-MCNC: 128 MG/DL
GLUCOSE UR STRIP-MCNC: NEGATIVE MG/DL
HBA1C MFR BLD: 6.1 % (ref 0–5.6)
HCT VFR BLD AUTO: 42.7 % (ref 35–47)
HGB BLD-MCNC: 13.9 G/DL (ref 11.7–15.7)
HGB UR QL STRIP: NEGATIVE
KETONES UR STRIP-MCNC: NEGATIVE MG/DL
LEUKOCYTE ESTERASE UR QL STRIP: NEGATIVE
MCH RBC QN AUTO: 29.4 PG (ref 26.5–33)
MCHC RBC AUTO-ENTMCNC: 32.6 G/DL (ref 31.5–36.5)
MCV RBC AUTO: 90 FL (ref 78–100)
NITRATE UR QL: NEGATIVE
PH UR STRIP: 7 [PH] (ref 5–8)
PLATELET # BLD AUTO: 279 10E3/UL (ref 150–450)
RBC # BLD AUTO: 4.73 10E6/UL (ref 3.8–5.2)
SP GR UR STRIP: 1.02 (ref 1–1.03)
UROBILINOGEN UR STRIP-ACNC: 0.2 E.U./DL
WBC # BLD AUTO: 7.8 10E3/UL (ref 4–11)

## 2024-10-07 PROCEDURE — 99214 OFFICE O/P EST MOD 30 MIN: CPT | Mod: 25 | Performed by: INTERNAL MEDICINE

## 2024-10-07 PROCEDURE — 96372 THER/PROPH/DIAG INJ SC/IM: CPT | Performed by: INTERNAL MEDICINE

## 2024-10-07 PROCEDURE — 84443 ASSAY THYROID STIM HORMONE: CPT | Performed by: INTERNAL MEDICINE

## 2024-10-07 PROCEDURE — 82306 VITAMIN D 25 HYDROXY: CPT | Performed by: INTERNAL MEDICINE

## 2024-10-07 PROCEDURE — 83036 HEMOGLOBIN GLYCOSYLATED A1C: CPT | Performed by: INTERNAL MEDICINE

## 2024-10-07 PROCEDURE — 85027 COMPLETE CBC AUTOMATED: CPT | Performed by: INTERNAL MEDICINE

## 2024-10-07 PROCEDURE — G0439 PPPS, SUBSEQ VISIT: HCPCS | Performed by: INTERNAL MEDICINE

## 2024-10-07 PROCEDURE — G0008 ADMIN INFLUENZA VIRUS VAC: HCPCS | Performed by: INTERNAL MEDICINE

## 2024-10-07 PROCEDURE — 80061 LIPID PANEL: CPT | Performed by: INTERNAL MEDICINE

## 2024-10-07 PROCEDURE — 36415 COLL VENOUS BLD VENIPUNCTURE: CPT | Performed by: INTERNAL MEDICINE

## 2024-10-07 PROCEDURE — 80053 COMPREHEN METABOLIC PANEL: CPT | Performed by: INTERNAL MEDICINE

## 2024-10-07 PROCEDURE — 90662 IIV NO PRSV INCREASED AG IM: CPT | Performed by: INTERNAL MEDICINE

## 2024-10-07 PROCEDURE — 81003 URINALYSIS AUTO W/O SCOPE: CPT | Performed by: INTERNAL MEDICINE

## 2024-10-07 SDOH — HEALTH STABILITY: PHYSICAL HEALTH: ON AVERAGE, HOW MANY DAYS PER WEEK DO YOU ENGAGE IN MODERATE TO STRENUOUS EXERCISE (LIKE A BRISK WALK)?: 7 DAYS

## 2024-10-07 SDOH — HEALTH STABILITY: PHYSICAL HEALTH: ON AVERAGE, HOW MANY MINUTES DO YOU ENGAGE IN EXERCISE AT THIS LEVEL?: 120 MIN

## 2024-10-07 ASSESSMENT — SOCIAL DETERMINANTS OF HEALTH (SDOH): HOW OFTEN DO YOU GET TOGETHER WITH FRIENDS OR RELATIVES?: NEVER

## 2024-10-07 NOTE — PROGRESS NOTES
Preventive Care Visit  [facility]  Joellen Christensen MD, Internal Medicine  Oct 7, 2024      Assessment & Plan     Encounter for annual wellness visit (AWV) in Medicare patient      Gastroesophageal reflux disease with esophagitis without hemorrhage  Stable on omeprazole.  - omeprazole (PRILOSEC) 20 MG DR capsule; Take 1 capsule (20 mg) by mouth daily.  - CBC with platelets; Future  - Comprehensive metabolic panel; Future  - Hemoglobin A1c; Future  - Lipid panel reflex to direct LDL Non-fasting; Future  - TSH with free T4 reflex; Future  - UA Macroscopic with reflex to Microscopic and Culture; Future  - CBC with platelets  - Comprehensive metabolic panel  - Hemoglobin A1c  - Lipid panel reflex to direct LDL Non-fasting  - TSH with free T4 reflex  - UA Macroscopic with reflex to Microscopic and Culture    Age-related osteoporosis without current pathological fracture  Prolia # 6 today. She has scheduled DXA scan at the end of the month.  - Vitamin D Deficiency; Future  - Vitamin D Deficiency    Prediabetes  Low sugar diet recommended.  - Hemoglobin A1c; Future  - Hemoglobin A1c    Mass of upper outer quadrant of left breast  Palpable small nodule on the left breast on the exam today.  - MA Diagnostic Left w/ Solo; Future    Generalized muscle weakness  Balance problems  She noticed more trouble when going outside of the house and usually has to holed her daughter's hand to walk around the store or make a step on the curb. This is new for her and she was always very active. She did not have any falls, but feel more unstable and has poor balance.  She will start the PT.  - Physical Therapy  Referral; Future        Screening for endocrine, metabolic and immunity disorder    - CBC with platelets; Future  - Comprehensive metabolic panel; Future  - Hemoglobin A1c; Future  - Lipid panel reflex to direct LDL Non-fasting; Future  - TSH with free T4 reflex; Future  - CBC with platelets  -  Comprehensive metabolic panel  - Hemoglobin A1c  - Lipid panel reflex to direct LDL Non-fasting  - TSH with free T4 reflex    Screening for blood or protein in urine    - UA Macroscopic with reflex to Microscopic and Culture; Future  - UA Macroscopic with reflex to Microscopic and Culture    Abnormal findings on diagnostic imaging of other specified body structures    - TSH with free T4 reflex; Future  - TSH with free T4 reflex            Counseling  Appropriate preventive services were addressed with this patient via screening, questionnaire, or discussion as appropriate for fall prevention, nutrition, physical activity, Tobacco-use cessation, social engagement, weight loss and cognition.  Checklist reviewing preventive services available has been given to the patient.  Reviewed patient's diet, addressing concerns and/or questions.   Patient is at risk for social isolation and has been provided with information about the benefit of social connection.   Updated plan of care.  Patient reported difficulty with activities of daily living were addressed today.The patient was provided with written information regarding signs of hearing loss.           Lg Cervantes is a 84 year old, presenting for the following:  Medicare Visit (Prolia)        10/7/2024     4:07 PM   Additional Questions   Roomed by Scarlett YUAN   Accompanied by daughter         Health Care Directive  Patient does not have a Health Care Directive or Living Will: Discussed advance care planning with patient; information given to patient to review.    HPI              10/7/2024   General Health   How would you rate your overall physical health? Good   Feel stress (tense, anxious, or unable to sleep) Not at all            10/7/2024   Nutrition   Diet: Vegetarian/vegan            10/7/2024   Exercise   Days per week of moderate/strenous exercise 7 days   Average minutes spent exercising at this level 120 min            10/7/2024   Social Factors   Frequency  of gathering with friends or relatives Never   Worry food won't last until get money to buy more No   Food not last or not have enough money for food? No   Do you have housing? (Housing is defined as stable permanent housing and does not include staying ouside in a car, in a tent, in an abandoned building, in an overnight shelter, or couch-surfing.) Yes   Are you worried about losing your housing? No   Lack of transportation? No   Unable to get utilities (heat,electricity)? No      (!) SOCIAL CONNECTIONS CONCERN      10/7/2024   Fall Risk   Fallen 2 or more times in the past year? No    No   Trouble with walking or balance? Yes    Yes       Multiple values from one day are sorted in reverse-chronological order           10/7/2024   Activities of Daily Living- Home Safety   Needs help with the following daily activites Transportation   Safety concerns in the home None of the above            10/7/2024   Dental   Dentist two times every year? Yes            10/7/2024   Hearing Screening   Hearing concerns? (!) TROUBLE UNDERSTANDING SOFT OR WHISPERED SPEECH.            10/7/2024   Driving Risk Screening   Patient/family members have concerns about driving No            10/7/2024   General Alertness/Fatigue Screening   Have you been more tired than usual lately? No            10/7/2024   Urinary Incontinence Screening   Bothered by leaking urine in past 6 months No            10/7/2024   TB Screening   Were you born outside of the [place]? Yes            Today's PHQ-2 Score:       10/7/2024     4:11 PM   PHQ-2 ( 1999 Pf[employer])   Q1: Little interest or pleasure in doing things 0   Q2: Feeling down, depressed or hopeless 0   PHQ-2 Score 0   Q1: Little interest or pleasure in doing things Not at all   Q2: Feeling down, depressed or hopeless Not at all   PHQ-2 Score 0           10/7/2024   Substance Use   Alcohol more than 3/day or more than 7/wk Not Applicable   Do you have a current opioid prescription? No   How severe/bad is  pain from 1 to 10? 2/10   Do you use any other substances recreationally? No        Social History     Tobacco Use    Smoking status: Never     Passive exposure: Never    Smokeless tobacco: Never   Vaping Use    Vaping status: Never Used   Substance Use Topics    Alcohol use: No    Drug use: No           9/10/2022   LAST FHS-7 RESULTS   1st degree relative breast or ovarian cancer No   Any relative bilateral breast cancer No   Any male have breast cancer No   Any ONE woman have BOTH breast AND ovarian cancer No   Any woman with breast cancer before 50yrs No   2 or more relatives with breast AND/OR ovarian cancer No   2 or more relatives with breast AND/OR bowel cancer No                         Reviewed and updated as needed this visit by Provider                      Current providers sharing in care for this patient include:  Patient Care Team:  Joellen Christensen MD as PCP - General (Internal Medicine)  Hortencia Davis LSW as Lead Care Coordinator (Primary Care - CC)  Joellen Christensen MD as Assigned PCP  Joellen Christensen MD as Physician (Internal Medicine)  Gabriel Gomez MD as MD (Otolaryngology)  Gabriel Gomez MD as Assigned Surgical Provider  Serene Krueger AuD as Audiologist (Audiology)    The following health maintenance items are reviewed in Epic and correct as of today:  Health Maintenance   Topic Date Due    ZOSTER IMMUNIZATION (1 of 2) Never done    RSV VACCINE (1 - 1-dose 75+ series) Never done    DTAP/TDAP/TD IMMUNIZATION (2 - Td or Tdap) 08/09/2022    MEDICARE ANNUAL WELLNESS VISIT  03/28/2024    COVID-19 Vaccine (7 - 2024-25 season) 09/01/2024    ANNUAL REVIEW OF HM ORDERS  10/07/2025    FALL RISK ASSESSMENT  10/07/2025    ADVANCE CARE PLANNING  10/07/2029    DEXA  08/05/2037    PHQ-2 (once per calendar year)  Completed    INFLUENZA VACCINE  Completed    Pneumococcal Vaccine: 65+ Years  Completed    HPV IMMUNIZATION  Aged Out    MENINGITIS IMMUNIZATION  Aged Out    RSV MONOCLONAL ANTIBODY  Aged Out  "           Objective    Exam  BP (!) 148/70   Pulse 68   Temp 98  F (36.7  C)   Resp 16   Ht 1.499 m (4' 11\")   Wt 45.1 kg (99 lb 6 oz)   LMP  (LMP Unknown)   SpO2 97%   BMI 20.07 kg/m     Estimated body mass index is 20.07 kg/m  as calculated from the following:    Height as of this encounter: 1.499 m (4' 11\").    Weight as of this encounter: 45.1 kg (99 lb 6 oz).    Physical Exam  General Appearance: Alert, cooperative, no distress, appears stated age.  Head: Normocephalic, without obvious abnormality, atraumatic  Eyes: PERRL, conjunctiva/corneas clear, EOM's intact  Ears: Normal TM's and external ear canals, both ears  Nose: Nares normal, septum midline,mucosa normal, no drainage  Throat: Lips, mucosa, and tongue normal; teeth and gums normal  Neck: Supple, symmetrical, trachea midline, no adenopathy;  thyroid: not enlarged, symmetric, no tenderness/mass/nodules; no carotid bruit or JVD  Back: Symmetric, no curvature, ROM normal, no CVA tenderness.  Lungs: Clear to auscultation bilaterally, respirations unlabored.  Breasts: No breast masses, tenderness, asymmetry, or nipple discharge.  Heart: Regular rate and rhythm, S1 and S2 normal, no murmur, rub, or gallop.  Abdomen: Soft, non-tender, bowel sounds active all four quadrants,  no masses, no organomegaly.  Extremities: Extremities normal, atraumatic, no cyanosis or edema.  Skin: Skin color, texture, turgor normal, no rashes or lesions.  Lymph nodes: Cervical, supraclavicular, and axillary nodes normal.  Neurologic: No focal neurological findings.           10/7/2024   Mini Cog   Mini-Cog Not Completed (choose reason) Language barrier and no  present   Clock Draw Score 2 Normal   3 Item Recall 3 objects recalled   Mini Cog Total Score 5                 Signed Electronically by: Joellen Christensen MD    "

## 2024-10-07 NOTE — PATIENT INSTRUCTIONS
Prolia 6th today. Flu vaccine today. Labs today.  Prolia 7th in 6 months with RN. I will see you in 1 year.    DXA - 10/30/24 .   Phone number to schedule [phone].    Daily calcium need is 2430-7199 mg a day from the diet and supplements.  Calcium citrate is easier to digest.  Vitamin D 2000 IU daily recommended.    Risk of rebound vertebral fractures is higher when Prolia suddenly stopped or dose was missed.      Prolia and Covid vaccine should be  for at least a week.    Patient education:  Patients advised to maintain good oral hygiene during treatment, because of the risk for osteonecrosis of the jaw.   The risk of osteonecrosis of the jaw with Prolia therapy is extremely low.   If a patient is late for Prolia therapy (>3 wks) a rapid rebound of bone loss can occur which is highly concerning and important to try to prevent to optimize bone health.   Therefore if an invasive dental procedure is required, primarily extraction or implant, while on Prolia therapy, the recommendation is to time the dental procedure optimally 4 months after the most recent dose of Prolia allowing 6-8 weeks for healing prior to next dose of Prolia.   This is based on the updated 2022 Recommendations from the American Association of Oral and Maxillofacial Surgeons.   We also recommend discussing with dentist or oral surgeon if pretreatment prophylactic oral rinses and antibiotics would be beneficial.   Optimizing oral hygiene before any invasive dental procedure significantly lowers ONJ risk.     There is a greater risk of severe hypocalcemia following denosumab administration and patient is advised that we will have to monitor calcium, phosphorous, and magnesium levels. Risk of infection is increased with denosumab use, so patient will inform me if has more frequent infections.     Atypical femur fracture  has been reported in patients receiving denosumab. The fractures may occur anywhere along the femoral shaft (may be  bilateral) and commonly occur with minimal to no trauma to the area. Some patients experience prodromal thigh or groin pain weeks or months before the fracture occurs. Contralateral limb should be assessed if AFF occurs.     Multiple vertebral column fracture has been reported following discontinuation of therapy. Hypertension and increased cholesterol were reported with denosumab use.      Discussed 10-year data of Prolia. Discussed the importance of being on time and consistent with Prolia use to prevent rapid bone of osteoclastic activity.  Discussed that if Prolia is discontinued we will likely need to utilize an antiresorptive, such as Reclast, to help prevent rapid rebound of osteoclast activity. Often more than 1 dose is required.  Labs yearly to ensure calcium and vitamin D optimized while patient on Prolia.     220

## 2024-10-08 LAB
ALBUMIN SERPL BCG-MCNC: 4.2 G/DL (ref 3.5–5.2)
ALP SERPL-CCNC: 104 U/L (ref 40–150)
ALT SERPL W P-5'-P-CCNC: 17 U/L (ref 0–50)
ANION GAP SERPL CALCULATED.3IONS-SCNC: 11 MMOL/L (ref 7–15)
AST SERPL W P-5'-P-CCNC: 27 U/L (ref 0–45)
BILIRUB SERPL-MCNC: 0.3 MG/DL
BUN SERPL-MCNC: 14.5 MG/DL (ref 8–23)
CALCIUM SERPL-MCNC: 9.5 MG/DL (ref 8.8–10.4)
CHLORIDE SERPL-SCNC: 101 MMOL/L (ref 98–107)
CHOLEST SERPL-MCNC: 230 MG/DL
CREAT SERPL-MCNC: 0.86 MG/DL (ref 0.51–0.95)
EGFRCR SERPLBLD CKD-EPI 2021: 67 ML/MIN/1.73M2
FASTING STATUS PATIENT QL REPORTED: NO
FASTING STATUS PATIENT QL REPORTED: NO
GLUCOSE SERPL-MCNC: 147 MG/DL (ref 70–99)
HCO3 SERPL-SCNC: 28 MMOL/L (ref 22–29)
HDLC SERPL-MCNC: 49 MG/DL
LDLC SERPL CALC-MCNC: 137 MG/DL
NONHDLC SERPL-MCNC: 181 MG/DL
POTASSIUM SERPL-SCNC: 4.4 MMOL/L (ref 3.4–5.3)
PROT SERPL-MCNC: 7.8 G/DL (ref 6.4–8.3)
SODIUM SERPL-SCNC: 140 MMOL/L (ref 135–145)
TRIGL SERPL-MCNC: 222 MG/DL
TSH SERPL DL<=0.005 MIU/L-ACNC: 1.68 UIU/ML (ref 0.3–4.2)
VIT D+METAB SERPL-MCNC: 58 NG/ML (ref 20–50)

## 2024-10-17 ENCOUNTER — THERAPY VISIT (OUTPATIENT)
Dept: PHYSICAL THERAPY | Facility: REHABILITATION | Age: 84
End: 2024-10-17
Attending: INTERNAL MEDICINE
Payer: COMMERCIAL

## 2024-10-17 DIAGNOSIS — R26.89 BALANCE PROBLEMS: ICD-10-CM

## 2024-10-17 DIAGNOSIS — M62.81 GENERALIZED MUSCLE WEAKNESS: ICD-10-CM

## 2024-10-17 PROCEDURE — 97110 THERAPEUTIC EXERCISES: CPT | Mod: GP

## 2024-10-17 PROCEDURE — 97161 PT EVAL LOW COMPLEX 20 MIN: CPT | Mod: GP

## 2024-10-17 NOTE — PROGRESS NOTES
PHYSICAL THERAPY EVALUATION  Type of Visit: Evaluation     Fall Risk Screen:  Fall screen completed by: PT  Have you fallen 2 or more times in the past year?: No  Have you fallen and had an injury in the past year?: No  Is patient a fall risk?: No    Subjective         Patient presents to PT for eval and treat for balance deficits.  Patient states that she has difficulty with stairs  with no railing, stepping off a curb, and walking on uneven surfaces.  Patient reports that she could tell the symptoms started a little before covid and initiated PT for the same condition, but after covid started she was not able to come to therapy.  Patient would like to be able to walk without holding onto her daughter and feel more confident when walking on uneven surfaces. She also notes  some  pain  over  the medial   arch of her left foot  that feels better  after she rubs  her feet.    Presenting condition or subjective complaint: balancing while walking  Date of onset: 10/07/24 (order date)    Relevant medical history: Arthritis   Dates & types of surgery: hysterectomy 1977 ovary removal 2012    Prior diagnostic imaging/testing results:       Prior therapy history for the same diagnosis, illness or injury:        Prior Level of Function  Transfers: Independent  Ambulation: Independent, Assistive person  ADL: Independent, Assistive person  IADL:     Living Environment  Social support:     Type of home: Multi-level   Stairs to enter the home:         Ramp: No   Stairs inside the home: Yes 7 Is there a railing: Yes     Help at home: None  Equipment owned:       Employment: No    Hobbies/Interests: sudoku  reading    Patient goals for therapy: walk on un even ground    Pain assessment: See objective evaluation for additional pain details     Objective      Cognitive Status Examination  Orientation: Oriented to person, place and time   Level of Consciousness: Alert  Follows Commands and Answers Questions: 100% of the time  Personal  Safety and Judgement: Intact  Memory: Intact  INTEGUMENTARY: Intact  POSTURE: Sitting Posture: Rounded shoulders, Forward head, Lordosis decreased, Thoracic kyphosis increased  RANGE OF MOTION: LE ROM WFL  STRENGTH:   Pain: - none + mild ++ moderate +++ severe  Strength Scale: 0-5/5 Left Right   Hip Flexion 3 3   Hip Extension 3+ 3+   Hip Abduction 3+ 3+   Hip Adduction 3 3   Hip Internal Rotation 3+ 3+   Hip External Rotation 3+ 3+   Knee Flexion 3 3   Knee Extension 3+ 3+       BED MOBILITY: WNL    TRANSFERS: WNL    GAIT:   Assistive Device(s): None  Gait Deviations: Antalgic  Stride length decreased  Katy decreased    SPECIAL TESTS  Functional Gait Assessment (FGA) TOTAL SCORE: (MAXIMUM SCORE 30): 7    10 Meter Walk Test (Comfortable)     10 Meter Walk Test (Fast)     6 Minute Walk Test (6MWT)           [place] Balance Scale (BBS)     5 Times Sit-to-Stand (5TSTS)       Dynamic Gait Index (DGI)     Timed Up and Go (TUG) - sec 18.56 seconds   Single Leg Stance Right (sec)    Single Leg Stance Left (sec)    Modified CTSIB Conditions (sec) Cond 1:   Cond 2:   Cond 4:   Cond 5 :    Romberg  (sec)    Sharpened Romberg (sec)    30 Second Sit to Stand (reps/height) 5 reps w/ 2 hands   Mini-BESTest              SENSATION: LE Sensation WNL      Assessment & Plan   CLINICAL IMPRESSIONS  Medical Diagnosis: Generalized muscle weakness, Balance problems    Treatment Diagnosis: Gait impairments, weakness, decreased stability, fall risk   Impression/Assessment: Patient is a 84 year old female with balance deficit complaints.  The following significant findings have been identified: Pain, Decreased ROM/flexibility, Decreased joint mobility, Decreased strength, Impaired balance, Impaired gait, Impaired muscle performance, Decreased activity tolerance, Impaired posture, and Instability. These impairments interfere with their ability to perform self care tasks, work tasks, recreational activities, household chores, household  mobility, and community mobility as compared to previous level of function.     Clinical Decision Making (Complexity):  Clinical Presentation: Stable/Uncomplicated  Clinical Presentation Rationale: based on medical and personal factors listed in PT evaluation  Clinical Decision Making (Complexity): Low complexity    PLAN OF CARE  Treatment Interventions:  Interventions: Gait Training, Manual Therapy, Neuromuscular Re-education, Therapeutic Activity, Therapeutic Exercise, Self-Care/Home Management    Long Term Goals     PT Goal 1  Goal Identifier: HEP  Goal Description: Pt will demonstrate understanding and independece of HEP in 30 days.  Rationale: to maximize safety and independence with performance of ADLs and functional tasks  Goal Progress: initial  Target Date: 11/16/24  PT Goal 2  Goal Identifier: 6MWT  Goal Description: Pt will improve 6MWT to at least 538 meters by the end of therapy in order to demonstrate improvement in pts endurance and the normative values for pts age range.  Goal Progress: initial  Target Date: 01/14/25  PT Goal 3  Goal Identifier: TUG  Goal Description: Pt will improve TUG by 2.9 seconds by the end of therapy in order to demonstrate MDC for pts with strokes and show improved functional mobility and improved ADLs.  Goal Progress: initial  Target Date: 01/14/25  PT Goal 4  Goal Identifier: FGA  Goal Description: Pt will improve FGA score by 5 points by the end of therapy in order to demonstrate MDC for pts with strokes and show improved functional mobility and improved ADLs.  Goal Progress: initial  Target Date: 01/14/25      Frequency of Treatment: 1 x / week  Duration of Treatment: 90 days    Recommended Referrals to Other Professionals:   Education Assessment:   Learner/Method: Patient;Demonstration    Risks and benefits of evaluation/treatment have been explained.   Patient/Family/caregiver agrees with Plan of Care.     Evaluation Time:     PT Eval, Low Complexity Minutes (08471):  10       Signing Clinician: Devi Wells, AGNES        [facility]                                                                                   OUTPATIENT PHYSICAL THERAPY      PLAN OF TREATMENT FOR OUTPATIENT REHABILITATION   Patient's Last Name, First Name, Helen Appiah    YOB: 1940   Provider's Name   [facility]   Medical Record No.  [id number]     Onset Date: 10/07/24 (order date)  Start of Care Date: 10/17/24     Medical Diagnosis:  Generalized muscle weakness, Balance problems      PT Treatment Diagnosis:  Gait impairments, weakness, decreased stability, fall risk Plan of Treatment  Frequency/Duration: 1 x / week/ 90 days    Certification date from 10/17/24 to 01/14/25         See note for plan of treatment details and functional goals     Devi Wells, PT                         I CERTIFY THE NEED FOR THESE SERVICES FURNISHED UNDER        THIS PLAN OF TREATMENT AND WHILE UNDER MY CARE     (Physician attestation of this document indicates review and certification of the therapy plan).              Referring Provider:  Joellen Christensen    Initial Assessment  See Epic Evaluation- Start of Care Date: 10/17/24

## 2024-10-18 ENCOUNTER — ANCILLARY PROCEDURE (OUTPATIENT)
Dept: MAMMOGRAPHY | Facility: CLINIC | Age: 84
End: 2024-10-18
Attending: INTERNAL MEDICINE
Payer: COMMERCIAL

## 2024-10-18 DIAGNOSIS — N63.21 MASS OF UPPER OUTER QUADRANT OF LEFT BREAST: ICD-10-CM

## 2024-10-18 PROCEDURE — 77066 DX MAMMO INCL CAD BI: CPT

## 2024-10-18 PROCEDURE — 76642 ULTRASOUND BREAST LIMITED: CPT | Mod: LT

## 2024-10-25 ENCOUNTER — THERAPY VISIT (OUTPATIENT)
Dept: PHYSICAL THERAPY | Facility: REHABILITATION | Age: 84
End: 2024-10-25
Payer: COMMERCIAL

## 2024-10-25 DIAGNOSIS — M62.81 GENERALIZED MUSCLE WEAKNESS: Primary | ICD-10-CM

## 2024-10-25 DIAGNOSIS — R26.89 BALANCE PROBLEMS: ICD-10-CM

## 2024-10-25 PROCEDURE — 97112 NEUROMUSCULAR REEDUCATION: CPT | Mod: GP

## 2024-10-25 PROCEDURE — 97110 THERAPEUTIC EXERCISES: CPT | Mod: GP

## 2024-10-30 ENCOUNTER — ANCILLARY PROCEDURE (OUTPATIENT)
Dept: BONE DENSITY | Facility: CLINIC | Age: 84
End: 2024-10-30
Attending: INTERNAL MEDICINE
Payer: COMMERCIAL

## 2024-10-30 ENCOUNTER — THERAPY VISIT (OUTPATIENT)
Dept: PHYSICAL THERAPY | Facility: REHABILITATION | Age: 84
End: 2024-10-30
Attending: INTERNAL MEDICINE
Payer: COMMERCIAL

## 2024-10-30 DIAGNOSIS — M62.81 GENERALIZED MUSCLE WEAKNESS: Primary | ICD-10-CM

## 2024-10-30 DIAGNOSIS — M81.0 AGE-RELATED OSTEOPOROSIS WITHOUT CURRENT PATHOLOGICAL FRACTURE: ICD-10-CM

## 2024-10-30 PROCEDURE — 97110 THERAPEUTIC EXERCISES: CPT | Mod: GP

## 2024-10-30 PROCEDURE — 77091 TBS TECHL CALCULATION ONLY: CPT | Performed by: PHYSICIAN ASSISTANT

## 2024-10-30 PROCEDURE — 97112 NEUROMUSCULAR REEDUCATION: CPT | Mod: GP

## 2024-10-30 PROCEDURE — 77080 DXA BONE DENSITY AXIAL: CPT | Mod: TC | Performed by: PHYSICIAN ASSISTANT

## 2024-11-09 ENCOUNTER — IMMUNIZATION (OUTPATIENT)
Dept: FAMILY MEDICINE | Facility: CLINIC | Age: 84
End: 2024-11-09
Payer: COMMERCIAL

## 2024-11-09 PROCEDURE — 91320 SARSCV2 VAC 30MCG TRS-SUC IM: CPT

## 2024-11-09 PROCEDURE — 90480 ADMN SARSCOV2 VAC 1/ONLY CMP: CPT

## 2024-11-27 ENCOUNTER — THERAPY VISIT (OUTPATIENT)
Dept: PHYSICAL THERAPY | Facility: REHABILITATION | Age: 84
End: 2024-11-27
Attending: INTERNAL MEDICINE
Payer: COMMERCIAL

## 2024-11-27 DIAGNOSIS — M62.81 GENERALIZED MUSCLE WEAKNESS: Primary | ICD-10-CM

## 2024-11-27 DIAGNOSIS — R26.89 BALANCE PROBLEMS: ICD-10-CM

## 2024-11-27 PROCEDURE — 97112 NEUROMUSCULAR REEDUCATION: CPT | Mod: GP

## 2024-11-27 PROCEDURE — 97110 THERAPEUTIC EXERCISES: CPT | Mod: GP

## 2024-12-10 ENCOUNTER — MYC MEDICAL ADVICE (OUTPATIENT)
Dept: INTERNAL MEDICINE | Facility: CLINIC | Age: 84
End: 2024-12-10
Payer: COMMERCIAL

## 2024-12-11 ENCOUNTER — THERAPY VISIT (OUTPATIENT)
Dept: PHYSICAL THERAPY | Facility: REHABILITATION | Age: 84
End: 2024-12-11
Payer: COMMERCIAL

## 2024-12-11 DIAGNOSIS — R26.89 BALANCE PROBLEMS: ICD-10-CM

## 2024-12-11 DIAGNOSIS — M62.81 GENERALIZED MUSCLE WEAKNESS: Primary | ICD-10-CM

## 2024-12-11 PROCEDURE — 97110 THERAPEUTIC EXERCISES: CPT | Mod: GP

## 2024-12-11 PROCEDURE — 97112 NEUROMUSCULAR REEDUCATION: CPT | Mod: GP

## 2024-12-17 ENCOUNTER — THERAPY VISIT (OUTPATIENT)
Dept: PHYSICAL THERAPY | Facility: REHABILITATION | Age: 84
End: 2024-12-17
Attending: INTERNAL MEDICINE
Payer: COMMERCIAL

## 2024-12-17 DIAGNOSIS — M62.81 GENERALIZED MUSCLE WEAKNESS: Primary | ICD-10-CM

## 2024-12-17 DIAGNOSIS — R26.89 BALANCE PROBLEMS: ICD-10-CM

## 2024-12-17 PROCEDURE — 97112 NEUROMUSCULAR REEDUCATION: CPT | Mod: GP

## 2024-12-17 PROCEDURE — 97110 THERAPEUTIC EXERCISES: CPT | Mod: GP

## 2024-12-17 NOTE — PROGRESS NOTES
[facility]                                                                                   OUTPATIENT PHYSICAL THERAPY    PLAN OF TREATMENT FOR OUTPATIENT REHABILITATION   Patient's Last Name, First Name, Helen Appiah    YOB: 1940   Provider's Name   [facility]   Medical Record No.  [id number]     Onset Date: 10/07/24 (order date)  Start of Care Date: 10/17/24     Medical Diagnosis:  Generalized muscle weakness, Balance problems      PT Treatment Diagnosis:  Gait impairments, weakness, decreased stability, fall risk Plan of Treatment  Frequency/Duration: 1 x / week/ 90 days    Certification date from 12/17/24 to 03/16/25         See note for plan of treatment details and functional goals     Devi Wells PT                         I CERTIFY THE NEED FOR THESE SERVICES FURNISHED UNDER        THIS PLAN OF TREATMENT AND WHILE UNDER MY CARE     (Physician attestation of this document indicates review and certification of the therapy plan).              Referring Provider:  Joellen Christensen    Initial Assessment  See Epic Evaluation- Start of Care Date: 10/17/24            PLAN  Continue therapy per current plan of care.    Beginning/End Dates of Progress Note Reporting Period:  10/17/24 to 12/17/2024    Referring Provider:  Joellen Christensen        12/17/24 0500   Appointment Info   Signing clinician's name / credentials Devi Wells PT   Visits Used 6   Medical Diagnosis Generalized muscle weakness, Balance problems   PT Tx Diagnosis Gait impairments, weakness, decreased stability, fall risk   Other pertinent information Accompanied by daughter (Tanna)   Progress Note/Certification   Start of Care Date 10/17/24   Onset of illness/injury or Date of Surgery 10/07/24  (order date)   Therapy Frequency 1 x / week   Predicted Duration 90 days   Certification date from 12/17/24   Certification date to 03/16/25   Progress Note Due  Date 12/17/24   Progress Note Completed Date 10/17/24   GOALS   PT Goals 3;4;5   PT Goal 1   Goal Identifier HEP   Goal Description Pt will demonstrate understanding and independece of HEP in 30 days.   Rationale to maximize safety and independence with performance of ADLs and functional tasks   Goal Progress MET   Target Date 11/16/24   Date Met 12/17/24   PT Goal 2   Goal Identifier 10MWT   Goal Description Pt will improve 10MWT by 0.05 m/s by the end of therapy in order to demonstrate MCID and the normative values for pts age range.   Goal Progress initial   Target Date 03/16/25   PT Goal 3   Goal Identifier TUG   Goal Description Pt will improve TUG by 2.9 seconds by the end of therapy in order to demonstrate MDC for pts with strokes and show improved functional mobility and improved ADLs.   Goal Progress in progress   Target Date 03/16/25   PT Goal 4   Goal Identifier FGA   Goal Description Pt will improve FGA score by 5 points by the end of therapy in order to demonstrate MDC for pts with strokes and show improved functional mobility and improved ADLs.   Goal Progress MET- see obj measures   Target Date 01/14/25   Date Met 12/17/24   PT Goal 5   Goal Identifier New FGA   Goal Description Pt will improve FGA score by 5 points by the end of therapy in order to demonstrate MDC for pts with strokes and show improved functional mobility and improved ADLs.   Goal Progress initial   Target Date 03/16/25   Subjective Report   Subjective Report Pt reports she is doing well. She states her legs are the only things that bother her as she continues to want to improve her ease with walking. She states exercises are going well.   Objective Measure 1   Objective Measure 30 second STS   Details on eval: 5 reps, with 2 hands from standard chair; 12/17/24: 9 reps, with 2 hands from standard chair   Objective Measure 2   Objective Measure TUG   Details on eval: 18.56 seconds; on 12/17/24: 17.70 seconds   Objective Measure 3  "  Objective Measure FGA   Details on eval: 7/30; on 12/17/24: 12/30   Objective Measure 4   Objective Measure 10MWT   Details on 12/17/24: 0.86 m/s   Therapeutic Procedure/Exercise   Therapeutic Procedures: strength, endurance, ROM, flexibility minutes (05010) 30   Ther Proc 1 Nustep + subjective report   Ther Proc 1 - Details 6 minutes (level 3). See subjective report.   Ther Proc 2 Conducted TUG, 30 sec STS, FGA and 10MWT with pt to update values and gauge pts progress in therapy   Ther Proc 2 - Details see obj for detailed values   Skilled Intervention Exercises to help improve balance and LE strength, discussed therapy POC, demonstration & cueing provided   Patient Response/Progress Helen tolerated Nustep well.   Neuromuscular Re-education   Neuromuscular re-ed of mvmt, balance, coord, kinesthetic sense, posture, proprioception minutes (90276) 10   Neuro Re-ed 1 NT: Blaze Pods: random balance - \"small line\" configuration (4 pods)   Neuro Re-ed 1 - Details Right foot 1st trial: 8 hits, 00:30:00, average reaction time 3488 ms. Left foot 1st trial: 5 hits, 00:30:00, average reaction time 5556 ms. Right foot 2nd trial: 14 hits, 00:30:00, average reaction time 2007 ms. Left foot 2nd trial: 14 hits, 00:30:00, average reaction time 1907 ms. Right foot 3rd trial: 14 hits, 00:30:00, average reaction time 1892 ms. Left foot 3rd trial: 13 hits, 00:30:00, average reaction time 2154 ms. All trials: gait belt on; CGA; in parallel bars; frequent upper extremity assist/\"hip bump\" on bars throughout to maintain balance, though her performance improved with successive trials.   Neuro Re-ed 2 Tap-ups   Neuro Re-ed 2 - Details 2 x 10 bilateral: 6-inch step; continued cueing for slow and controlled movement; gait belt applied, CGA   Neuro Re-ed 3 Lateral walking   Neuro Re-ed 3 - Details 1 x 20 feet with each foot leading; gait belt applied, CGA, good performance   Neuro Re-ed 4 NT: Carioca walking   Neuro Re-ed 4 - Details Trialed, " but patient was unable to coordinate the movement and complete it without loss of balance   Neuro Re-ed 5 NT: Retro walking   Neuro Re-ed 5 - Details 2 x 20 feet; gait belt applied CGA; one instance of min loss of balance, but patient self-stabilized with stepping strategy and min use of upper extremity on nearby table;   Neuro Re-ed 6 Semi-tandem stance   Neuro Re-ed 6 - Details Firm ground, eyes open, arms crossed: 1 x 30 seconds with each foot leading (min sway with right foot leading, min-mod sway with left foot leading; CGA; intermittent upper extremity assist to stabilize; improved performance with cueing for increased upright posture and forward gaze)   Skilled Intervention Static and dynamic balance training to increase safety and independence with functional balance tasks.   Patient Response/Progress Response as noted above.   Education   Learner/Method Patient;Demonstration   Plan   Home program PTRx   Plan for next session Continue to review and progress exercises as able, progress balance training   Comments   Comments Assessment: Pt returns for a follow up visit. She has been compliant with HEP. Time spent in session re-assessing pts initial obj measures. She has improved in her 30 sec STS, FGA and TUG since starting therapy, see obj measures for detailed values. She continues to demonstrate significant balance deficits as noted on FGA with pt unable to or refusal to complete tandem stance, backwards walking and walking with eyes closed. We continued balance training today which pt tolerated well, slight LOB noted throughout ex with no overt LOB, but CGA required to regain balance. She remains highly motivated to participate and will continue to benefit from physical therapy to improve her function and independence.   Total Session Time   Timed Code Treatment Minutes 40   Total Treatment Time (sum of timed and untimed services) 40

## 2025-01-06 ENCOUNTER — OFFICE VISIT (OUTPATIENT)
Dept: INTERNAL MEDICINE | Facility: CLINIC | Age: 85
End: 2025-01-06
Payer: COMMERCIAL

## 2025-01-06 VITALS
WEIGHT: 102 LBS | RESPIRATION RATE: 16 BRPM | DIASTOLIC BLOOD PRESSURE: 80 MMHG | HEART RATE: 70 BPM | HEIGHT: 59 IN | OXYGEN SATURATION: 96 % | BODY MASS INDEX: 20.56 KG/M2 | TEMPERATURE: 97.8 F | SYSTOLIC BLOOD PRESSURE: 130 MMHG

## 2025-01-06 DIAGNOSIS — Z01.818 PREOP GENERAL PHYSICAL EXAM: Primary | ICD-10-CM

## 2025-01-06 DIAGNOSIS — K21.00 GASTROESOPHAGEAL REFLUX DISEASE WITH ESOPHAGITIS WITHOUT HEMORRHAGE: ICD-10-CM

## 2025-01-06 DIAGNOSIS — H26.9 CATARACT OF BOTH EYES, UNSPECIFIED CATARACT TYPE: ICD-10-CM

## 2025-01-06 PROCEDURE — 99214 OFFICE O/P EST MOD 30 MIN: CPT | Performed by: NURSE PRACTITIONER

## 2025-01-06 NOTE — PATIENT INSTRUCTIONS
How to Take Your Medication Before Surgery  Preoperative Medication Instructions   Antiplatelet or Anticoagulation Medication Instructions   - Patient is on no antiplatelet or anticoagulation medications.   - Bleeding risk is low for this procedure (e.g. dental, skin, cataract).    Additional Medication Instructions  Take all scheduled medications on the day of surgery       Patient Education   Preparing for Your Surgery  For Adults  Getting started  In most cases, a nurse will call to review your health history and instructions. They will give you an arrival time based on your scheduled surgery time. Please be ready to share:  Your doctor's clinic name and phone number  Your medical, surgical, and anesthesia history  A list of allergies and sensitivities  A list of medicines, including herbal treatments and over-the-counter drugs  Whether the patient has a legal guardian (ask how to send us the papers in advance)  Note: You may not receive a call if you were seen at our PAC (Preoperative Assessment Center).  Please tell us if you're pregnant--or if there's any chance you might be pregnant. Some surgeries may injure a fetus (unborn baby), so they require a pregnancy test. Surgeries that are safe for a fetus don't always need a test, and you can choose whether to have one.   Preparing for surgery  Within 10 to 30 days of surgery: Have a pre-op exam (sometimes called an H&P, or History and Physical). This can be done at a clinic or pre-operative center.  If you're having a , you may not need this exam. Talk to your care team.  At your pre-op exam, talk to your care team about all medicines you take. (This includes CBD oil and any drugs, such as THC, marijuana, and other forms of cannabis.) If you need to stop any medicine before surgery, ask when to start taking it again.  This is for your safety. Many medicines and drugs can make you bleed too much during surgery. Some change how well surgery (anesthesia)  drugs work.  Call your insurance company to let them know you're having surgery. (If you don't have insurance, call [phone].)  Call your clinic if there's any change in your health. This includes a scrape or scratch near the surgery site, or any signs of a cold (sore throat, runny nose, cough, rash, fever).  Eating and drinking guidelines  For your safety: Unless your surgeon tells you otherwise, follow the guidelines below.  Eat and drink as normal until 8 hours before you arrive for surgery. After that, no food or milk. You can spit out gum when you arrive.  Drink clear liquids until 2 hours before you arrive. These are liquids you can see through, like water, Gatorade, and Propel Water. They also include plain black coffee and tea (no cream or milk).  No alcohol for 24 hours before you arrive. The night before surgery, stop any drinks that contain THC.  If your care team tells you to take medicine on the morning of surgery, it's okay to take it with a sip of water. No other medicines or drugs are allowed (including CBD oil)--follow your care team's instructions.  If you have questions the day of surgery, call your hospital or surgery center.   Preventing infection  Shower or bathe the night before and the morning of surgery. Follow the instructions your clinic gave you. (If no instructions, use regular soap.)  Don't shave or clip hair near your surgery site. We'll remove the hair if needed.  Don't smoke or vape the morning of surgery. No chewing tobacco for 6 hours before you arrive. A nicotine patch is okay. You may spit out nicotine gum when you arrive.  For some surgeries, the surgeon will tell you to fully quit smoking and nicotine.  We will make every effort to keep you safe from infection. We will:  Clean our hands often with soap and water (or an alcohol-based hand rub).  Clean the skin at your surgery site with a special soap that kills germs.  Give you a special gown to keep you warm. (Cold raises  the risk of infection.)  Wear hair covers, masks, gowns, and gloves during surgery.  Give antibiotic medicine, if prescribed. Not all surgeries need this medicine.  What to bring on the day of surgery  Photo ID and insurance card  Copy of your health care directive, if you have one  Glasses and hearing aids (bring cases)  You can't wear contacts during surgery  Inhaler and eye drops, if you use them (tell us about these when you arrive)  CPAP machine or breathing device, if you use them  A few personal items, if spending the night  If you have . . .  A pacemaker, ICD (cardiac defibrillator), or other implant: Bring the ID card.  An implanted stimulator: Bring the remote control.  A legal guardian: Bring a copy of the certified (court-stamped) guardianship papers.  Please remove any jewelry, including body piercings. Leave jewelry and other valuables at home.  If you're going home the day of surgery  You must have a responsible adult drive you home. They should stay with you overnight as well.  If you don't have someone to stay with you, and you aren't safe to go home alone, we may keep you overnight. Insurance often won't pay for this.  After surgery  If it's hard to control your pain or you need more pain medicine, please call your surgeon's office.  Questions?   If you have any questions for your care team, list them here:   ____________________________________________________________________________________________________________________________________________________________________________________________________________________________________________________________  For informational purposes only. Not to replace the advice of your health care provider. Copyright   2003, 2019 [facility]. All rights reserved. Clinically reviewed by Elías Mccormack MD. [employer] [id number] - REV 08/24.

## 2025-01-06 NOTE — PROGRESS NOTES
Preoperative Evaluation  [facility]  [address] [address]  Phone: [phone]  Fax: [phone]  Primary Provider: Joellen Christensen MD  Pre-op Performing Provider: Yara Laguna NP  Jan 6, 2025 1/6/2025   Surgical Information   What procedure is being done? cataract surgery   Facility or Hospital where procedure/surgery will be performed: [facility]   Who is doing the procedure / surgery? Dr Shahnaz Clement   Date of surgery / procedure: 01/15/2025   Time of surgery / procedure: tbd   Where do you plan to recover after surgery? at home with family     Fax number for surgical facility: [facility] [phone]    Assessment & Plan     The proposed surgical procedure is considered LOW risk.    Preop general physical exam  Cataract of both eyes, unspecified cataract type  No contraindication to proceed with the planned procedure.  Reviewed preoperative guidelines.    Gastroesophageal reflux disease with esophagitis without hemorrhage  Acid reflux controlled with omeprazole daily.  Recommend she take this medication the morning of surgery with small sip of water.            - No identified additional risk factors other than previously addressed    Preoperative Medication Instructions  Antiplatelet or Anticoagulation Medication Instructions   - Patient is on no antiplatelet or anticoagulation medications.   - Bleeding risk is low for this procedure (e.g. dental, skin, cataract).    Additional Medication Instructions  Take all scheduled medications on the day of surgery    Recommendation  Approval given to proceed with proposed procedure, without further diagnostic evaluation.    Lg Cervantes is a 84 year old, presenting for the following:  Pre-Op Exam (Cataract surgery on 1/15 and 1/29 at [facility] by Dr Clement)          1/6/2025     3:39 PM   Additional Questions   Roomed by Martha GONZALEZ   Accompanied by Daughter  Tanna     HPI related to upcoming procedure: Helen is a pleasant 84-year-old female here today for preoperative evaluation prior to having cataract surgery.  She is having 1 eye completed followed by the other eye 2 weeks later.  No acute concerns today.        1/6/2025   Pre-Op Questionnaire   Have you ever had a heart attack or stroke? No   Have you ever had surgery on your heart or blood vessels, such as a stent placement, a coronary artery bypass, or surgery on an artery in your head, neck, heart, or legs? No   Do you have chest pain with activity? No   Do you have a history of heart failure? No   Do you currently have a cold, bronchitis or symptoms of other infection? No   Do you have a cough, shortness of breath, or wheezing? No   Do you or anyone in your family have previous history of blood clots? No   Do you or does anyone in your family have a serious bleeding problem such as prolonged bleeding following surgeries or cuts? No   Have you ever had problems with anemia or been told to take iron pills? (!) YES    Have you had any abnormal blood loss such as black, tarry or bloody stools, or abnormal vaginal bleeding? (!) YES    Have you ever had a blood transfusion? No   Are you willing to have a blood transfusion if it is medically needed before, during, or after your surgery? Yes   Have you or any of your relatives ever had problems with anesthesia? No   Do you have sleep apnea, excessive snoring or daytime drowsiness? No   Do you have any artifical heart valves or other implanted medical devices like a pacemaker, defibrillator, or continuous glucose monitor? No   Do you have artificial joints? No   Are you allergic to latex? No     Health Care Directive  Patient does not have a Health Care Directive: Discussed advance care planning with patient; however, patient declined at this time.    Preoperative Review of    reviewed - no record of controlled substances prescribed.          Patient Active Problem  "List    Diagnosis Date Noted    Prediabetes 10/07/2024     Priority: Medium    Neuropathy 03/28/2023     Priority: Medium    Malabsorption Syndrome      Priority: Medium     Created by Conversion  Replacement Utility updated for latest IMO load        Osteoporosis      Priority: Medium     Created by Conversion  Replacement Utility updated for latest IMO load        Chronic Reflux Esophagitis      Priority: Medium     Created by Conversion          No past medical history on file.  Past Surgical History:   Procedure Laterality Date    RUST APPENDECTOMY      Description: Appendectomy;  Recorded: 08/09/2012;    RUST TOTAL ABDOM HYSTERECTOMY      Description: Hysterectomy;  Recorded: 08/09/2012;     Current Outpatient Medications   Medication Sig Dispense Refill    artificial tears OINT ophthalmic ointment 3 times daily.      calcium cit-mag-D3-Zn--eva 356--3.75 mg-mg-unit-mg Tab [CALCIUM CIT-MAG-D3-ZN----3.75 MG-MG-UNIT-MG TAB] Take by mouth.      cholecalciferol, vitamin D3, (VITAMIN D3) 2,000 unit Tab [CHOLECALCIFEROL, VITAMIN D3, (VITAMIN D3) 2,000 UNIT TAB] Take by mouth.      omeprazole (PRILOSEC) 20 MG DR capsule Take 1 capsule (20 mg) by mouth daily. 90 capsule 3       Allergies   Allergen Reactions    Other Drug Allergy (See Comments) Unknown     Stomatil        Social History     Tobacco Use    Smoking status: Never     Passive exposure: Never    Smokeless tobacco: Never   Substance Use Topics    Alcohol use: No       History   Drug Use No           ROS  Comprehensive 12-point review of systems was completed and negative except as noted in HPI.      Objective    /80 (BP Location: Right arm, Patient Position: Sitting)   Pulse 70   Temp 97.8  F (36.6  C)   Resp 16   Ht 1.499 m (4' 11.02\")   Wt 46.3 kg (102 lb)   LMP  (LMP Unknown)   SpO2 96%   BMI 20.59 kg/m     Estimated body mass index is 20.59 kg/m  as calculated from the following:    Height as of this encounter: 1.499 " "scott (4' 11.02\").    Weight as of this encounter: 46.3 kg (102 lb).  Physical Exam  Constitutional: In no acute distress.  Clean appearance.  Oropharynx: Normal mucosa.  Dentition and gingiva is appropriate.  Posterior oropharynx without any abnormalities.  Neck: Supple.  Trachea is midline.  No thyromegaly.  Neck is without tenderness or masses.  Cardiovascular: Regular rate and rhythm.  Normal peripheral perfusion.  No edema.   Respiratory: Lungs are clear bilaterally.  Normal respiratory effort.  Skin: Skin is without significant rashes or lesions.  No suspicious moles.  Gastrointestinal: Soft and flat.  Normal bowel sounds.  Nontender throughout upon palpation.  No organomegaly or masses.   Musculoskeletal:  Gait normal.  Able to mount exam table without difficulties.  Psychiatric: Appropriate affect and demeanor.  Memory intact.  Good insight and judgment.  Neurologic: Sensation and temperature of extremities appropriate.  No tremor or involuntary movement noted.      Recent Labs   Lab Test 10/07/24  1712   HGB 13.9         POTASSIUM 4.4   CR 0.86   A1C 6.1*        Diagnostics  No labs were ordered during this visit.   No EKG required for low risk surgery (cataract, skin procedure, breast biopsy, etc).    Revised Cardiac Risk Index (RCRI)  The patient has the following serious cardiovascular risks for perioperative complications:   - No serious cardiac risks = 0 points     RCRI Interpretation: 0 points: Class I (very low risk - 0.4% complication rate)         Signed Electronically by: Yara Laguna NP  A copy of this evaluation report is provided to the requesting physician.        "

## 2025-01-08 ENCOUNTER — THERAPY VISIT (OUTPATIENT)
Dept: PHYSICAL THERAPY | Facility: REHABILITATION | Age: 85
End: 2025-01-08
Attending: INTERNAL MEDICINE
Payer: COMMERCIAL

## 2025-01-08 DIAGNOSIS — M62.81 GENERALIZED MUSCLE WEAKNESS: Primary | ICD-10-CM

## 2025-01-08 DIAGNOSIS — R26.89 BALANCE PROBLEMS: ICD-10-CM

## 2025-01-08 PROCEDURE — 97112 NEUROMUSCULAR REEDUCATION: CPT | Mod: GP

## 2025-01-08 PROCEDURE — 97110 THERAPEUTIC EXERCISES: CPT | Mod: GP

## 2025-03-20 ENCOUNTER — PATIENT OUTREACH (OUTPATIENT)
Dept: GERIATRIC MEDICINE | Facility: CLINIC | Age: 85
End: 2025-03-20
Payer: COMMERCIAL

## 2025-03-20 NOTE — PROGRESS NOTES
[facility] Care Coordination Contact    Called adult daughter Tanna  to schedule annual HRA home visit. Left a message requesting a return call to schedule HRA.    ADELAIDE Maguire  [facility]  Phone: [phone]

## 2025-04-18 PROBLEM — M62.81 GENERALIZED MUSCLE WEAKNESS: Status: ACTIVE | Noted: 2025-04-18

## 2025-04-18 PROBLEM — R26.89 BALANCE PROBLEMS: Status: ACTIVE | Noted: 2025-04-18

## 2025-07-21 ENCOUNTER — THERAPY VISIT (OUTPATIENT)
Dept: PHYSICAL THERAPY | Facility: REHABILITATION | Age: 85
End: 2025-07-21
Payer: COMMERCIAL

## 2025-07-21 DIAGNOSIS — R26.89 BALANCE PROBLEMS: ICD-10-CM

## 2025-07-21 DIAGNOSIS — M62.81 GENERALIZED MUSCLE WEAKNESS: Primary | ICD-10-CM

## 2025-07-21 PROCEDURE — 97110 THERAPEUTIC EXERCISES: CPT | Mod: GP

## 2025-07-21 NOTE — PROGRESS NOTES
DISCHARGE  Reason for Discharge: Patient has met all goals.    Equipment Issued: N/A    Discharge Plan: Patient to continue home program.    Referring Provider:  Joellen Christensen           07/21/25 0500   Appointment Info   Signing clinician's name / credentials Nicolas Enriquez, PT   Visits Used 17   Medical Diagnosis Generalized muscle weakness, Balance problems   PT Tx Diagnosis Gait impairments, weakness, decreased stability, fall risk   Other pertinent information Accompanied by daughter (Tanna)   Progress Note/Certification   Start of Care Date 10/17/24   Onset of illness/injury or Date of Surgery 10/07/24  (order date)   Therapy Frequency 1 x / week   Predicted Duration 90 days   Certification date from 06/06/25   Certification date to 09/04/25   Progress Note Completed Date 07/21/25   GOALS   PT Goals 3;4;5   PT Goal 1   Goal Identifier HEP   Goal Description Pt will demonstrate understanding and independece of HEP in 30 days.   Rationale to maximize safety and independence with performance of ADLs and functional tasks   Goal Progress MET   Target Date 11/16/24   Date Met 12/17/24   PT Goal 2   Goal Identifier 10MWT   Goal Description Pt will improve 10MWT by 0.05 m/s by the end of therapy in order to demonstrate MCID and the normative values for pts age range.   Goal Progress 0.85 meters/second - good improvement, back to where she was in April   Target Date 06/06/25   Date Met 06/13/25   PT Goal 3   Goal Identifier TUG   Goal Description Pt will improve TUG by 2.9 seconds by the end of therapy in order to demonstrate MDC for pts with strokes and show improved functional mobility and improved ADLs.   Goal Progress in progress- 15.23 seconds, 2.04 seconds improvement since last measurement, 2.9+ seconds improvement since initial evaluation   Target Date 09/04/25   Date Met 06/13/25   PT Goal 4   Goal Identifier FGA   Goal Description Pt will improve FGA score by 5 points by the end of therapy in order to  "demonstrate MDC for pts with strokes and show improved functional mobility and improved ADLs.   Goal Progress MET- see obj measures   Target Date 01/14/25   Date Met 12/17/24   PT Goal 5   Goal Identifier New FGA   Goal Description Pt will improve FGA score by 5 points by the end of therapy in order to demonstrate MDC for pts with strokes and show improved functional mobility and improved ADLs.   Goal Progress MET   Target Date 06/06/25   Date Met 06/13/25   Subjective Report   Subjective Report Helen reports that she has been \"working on the things,\" but she still sometimes feels the need to \"surf\" objects for stability while ambulating.   Objective Measure 1   Objective Measure 30 second STS   Details 5/16/25: 9 reps, no use of UE; 4/18/25: 9 reps (hands/arms on thighs)   Objective Measure 2   Objective Measure TUG   Details 5/16/24: 17.27; 4/18/125: 18.99 seconds; 6/13/25: 15.23 seconds; 7/21/25: 21.51 seconds   Objective Measure 3   Objective Measure FGA   Details on eval: 7/30; on 2/7/25: 11/30; on 5/16/25: 11/30; on 6/13/25: 16/30   Objective Measure 4   Objective Measure 10MWT   Details on 2/7/25: 0.64 m/s; on 6/13/24: 0.85 m/s   Therapeutic Procedure/Exercise   Therapeutic Procedures: strength, endurance, ROM, flexibility minutes (17171) 24   Ther Proc 2 Nustep + subjective report   Ther Proc 2 - Details 7.5 minutes. See subjective report (to monitor progress and guide treatment)   Ther Proc 3 Hamstring curl   Ther Proc 3 - Details Standing: 1 x 20 on each side   Ther Proc 4 Counter squat   Ther Proc 4 - Details Mini: 1 x 10   Ther Proc 5 Discharge planning with patient and family and discussion regarding continuation of HEP   Education   Learner/Method Patient;Demonstration   Plan   Home program PTRx   Updates to plan of care Discharge to [facility].   Comments   Comments Assessment: Helen returns to physical therapy reporting that she continues to work on her home exercises. She has met all of her goals, " indicating improved safety and independence with functional mobility, a decreased fall risk, and increased gait speed. These improvement support her subjective report of improved function. She is now appropriate for discharge to a home exercise/maintenance program, and patient/daughter agree with assessment and plan.   Total Session Time   Timed Code Treatment Minutes 24   Total Treatment Time (sum of timed and untimed services) 24

## 2025-07-27 DIAGNOSIS — K21.00 GASTROESOPHAGEAL REFLUX DISEASE WITH ESOPHAGITIS WITHOUT HEMORRHAGE: ICD-10-CM

## 2025-07-28 RX ORDER — OMEPRAZOLE 20 MG/1
20 CAPSULE, DELAYED RELEASE ORAL DAILY
Qty: 90 CAPSULE | Refills: 1 | Status: SHIPPED | OUTPATIENT
Start: 2025-07-28

## 2025-08-07 ENCOUNTER — OFFICE VISIT (OUTPATIENT)
Dept: OTOLARYNGOLOGY | Facility: CLINIC | Age: 85
End: 2025-08-07
Payer: COMMERCIAL

## 2025-08-07 DIAGNOSIS — H61.23 BILATERAL IMPACTED CERUMEN: Primary | ICD-10-CM

## 2025-08-08 ENCOUNTER — OFFICE VISIT (OUTPATIENT)
Dept: AUDIOLOGY | Facility: CLINIC | Age: 85
End: 2025-08-08
Payer: COMMERCIAL

## 2025-08-08 DIAGNOSIS — H90.3 SNHL (SENSORY-NEURAL HEARING LOSS), ASYMMETRICAL: Primary | ICD-10-CM

## 2025-08-08 PROCEDURE — 92557 COMPREHENSIVE HEARING TEST: CPT | Performed by: AUDIOLOGIST

## 2025-08-08 PROCEDURE — V5299 HEARING SERVICE: HCPCS | Performed by: AUDIOLOGIST

## 2025-08-08 PROCEDURE — 92567 TYMPANOMETRY: CPT | Performed by: AUDIOLOGIST

## 2025-08-28 DIAGNOSIS — Z92.29 PERSONAL HISTORY OF OTHER DRUG THERAPY: ICD-10-CM

## 2025-08-28 DIAGNOSIS — M81.0 AGE-RELATED OSTEOPOROSIS WITHOUT CURRENT PATHOLOGICAL FRACTURE: Primary | ICD-10-CM
